# Patient Record
Sex: MALE | Race: OTHER | NOT HISPANIC OR LATINO | Employment: FULL TIME | URBAN - METROPOLITAN AREA
[De-identification: names, ages, dates, MRNs, and addresses within clinical notes are randomized per-mention and may not be internally consistent; named-entity substitution may affect disease eponyms.]

---

## 2022-12-04 ENCOUNTER — HOSPITAL ENCOUNTER (INPATIENT)
Facility: HOSPITAL | Age: 52
LOS: 2 days | Discharge: HOME | End: 2022-12-06
Attending: EMERGENCY MEDICINE | Admitting: INTERNAL MEDICINE

## 2022-12-04 ENCOUNTER — APPOINTMENT (OUTPATIENT)
Dept: CT IMAGING | Facility: HOSPITAL | Age: 52
End: 2022-12-04

## 2022-12-04 ENCOUNTER — APPOINTMENT (OUTPATIENT)
Dept: GENERAL RADIOLOGY | Facility: HOSPITAL | Age: 52
End: 2022-12-04

## 2022-12-04 DIAGNOSIS — A41.9 SEPSIS, DUE TO UNSPECIFIED ORGANISM, UNSPECIFIED WHETHER ACUTE ORGAN DYSFUNCTION PRESENT: Primary | ICD-10-CM

## 2022-12-04 DIAGNOSIS — J18.9 PNEUMONIA OF LEFT UPPER LOBE DUE TO INFECTIOUS ORGANISM: ICD-10-CM

## 2022-12-04 DIAGNOSIS — R13.19 ESOPHAGEAL DYSPHAGIA: ICD-10-CM

## 2022-12-04 LAB
ALBUMIN SERPL-MCNC: 4.2 G/DL (ref 3.5–5.2)
ALBUMIN/GLOB SERPL: 1 G/DL
ALP SERPL-CCNC: 130 U/L (ref 39–117)
ALT SERPL W P-5'-P-CCNC: 27 U/L (ref 1–41)
ANION GAP SERPL CALCULATED.3IONS-SCNC: 12.8 MMOL/L (ref 5–15)
ANION GAP SERPL CALCULATED.3IONS-SCNC: 16.1 MMOL/L (ref 5–15)
AST SERPL-CCNC: 21 U/L (ref 1–40)
BACTERIA UR QL AUTO: ABNORMAL /HPF
BASOPHILS # BLD AUTO: 0.09 10*3/MM3 (ref 0–0.2)
BASOPHILS # BLD MANUAL: 0.26 10*3/MM3 (ref 0–0.2)
BASOPHILS NFR BLD AUTO: 0.3 % (ref 0–1.5)
BASOPHILS NFR BLD MANUAL: 1 % (ref 0–1.5)
BILIRUB SERPL-MCNC: 0.8 MG/DL (ref 0–1.2)
BILIRUB UR QL STRIP: NEGATIVE
BUN SERPL-MCNC: 18 MG/DL (ref 6–20)
BUN SERPL-MCNC: 22 MG/DL (ref 6–20)
BUN/CREAT SERPL: 12.5 (ref 7–25)
BUN/CREAT SERPL: 15.1 (ref 7–25)
CALCIUM SPEC-SCNC: 8.8 MG/DL (ref 8.6–10.5)
CALCIUM SPEC-SCNC: 9.7 MG/DL (ref 8.6–10.5)
CHLORIDE SERPL-SCNC: 92 MMOL/L (ref 98–107)
CHLORIDE SERPL-SCNC: 94 MMOL/L (ref 98–107)
CLARITY UR: CLEAR
CO2 SERPL-SCNC: 26.9 MMOL/L (ref 22–29)
CO2 SERPL-SCNC: 27.2 MMOL/L (ref 22–29)
COLOR UR: YELLOW
CREAT SERPL-MCNC: 1.19 MG/DL (ref 0.76–1.27)
CREAT SERPL-MCNC: 1.76 MG/DL (ref 0.76–1.27)
D DIMER PPP FEU-MCNC: 3.21 MCGFEU/ML (ref 0–0.54)
D-LACTATE SERPL-SCNC: 1.8 MMOL/L (ref 0.5–2)
D-LACTATE SERPL-SCNC: 2.3 MMOL/L (ref 0.5–2)
D-LACTATE SERPL-SCNC: 2.4 MMOL/L (ref 0.5–2)
D-LACTATE SERPL-SCNC: 3.3 MMOL/L (ref 0.5–2)
DEPRECATED RDW RBC AUTO: 44.3 FL (ref 37–54)
DEPRECATED RDW RBC AUTO: 45.5 FL (ref 37–54)
EGFRCR SERPLBLD CKD-EPI 2021: 45.4 ML/MIN/1.73
EGFRCR SERPLBLD CKD-EPI 2021: 72.6 ML/MIN/1.73
EOSINOPHIL # BLD AUTO: 0.02 10*3/MM3 (ref 0–0.4)
EOSINOPHIL # BLD MANUAL: 0.26 10*3/MM3 (ref 0–0.4)
EOSINOPHIL NFR BLD AUTO: 0.1 % (ref 0.3–6.2)
EOSINOPHIL NFR BLD MANUAL: 1 % (ref 0.3–6.2)
ERYTHROCYTE [DISTWIDTH] IN BLOOD BY AUTOMATED COUNT: 14.7 % (ref 12.3–15.4)
ERYTHROCYTE [DISTWIDTH] IN BLOOD BY AUTOMATED COUNT: 14.9 % (ref 12.3–15.4)
FLUAV AG NPH QL: NEGATIVE
FLUBV AG NPH QL IA: NEGATIVE
GLOBULIN UR ELPH-MCNC: 4.1 GM/DL
GLUCOSE BLDC GLUCOMTR-MCNC: 225 MG/DL (ref 70–99)
GLUCOSE BLDC GLUCOMTR-MCNC: 226 MG/DL (ref 70–99)
GLUCOSE BLDC GLUCOMTR-MCNC: 255 MG/DL (ref 70–99)
GLUCOSE BLDC GLUCOMTR-MCNC: 258 MG/DL (ref 70–99)
GLUCOSE SERPL-MCNC: 276 MG/DL (ref 65–99)
GLUCOSE SERPL-MCNC: 387 MG/DL (ref 65–99)
GLUCOSE UR STRIP-MCNC: ABNORMAL MG/DL
HCT VFR BLD AUTO: 33.2 % (ref 37.5–51)
HCT VFR BLD AUTO: 36.2 % (ref 37.5–51)
HGB BLD-MCNC: 11.1 G/DL (ref 13–17.7)
HGB BLD-MCNC: 12.1 G/DL (ref 13–17.7)
HGB UR QL STRIP.AUTO: ABNORMAL
HOLD SPECIMEN: NORMAL
HOLD SPECIMEN: NORMAL
HYALINE CASTS UR QL AUTO: ABNORMAL /LPF
IMM GRANULOCYTES # BLD AUTO: 1.58 10*3/MM3 (ref 0–0.05)
IMM GRANULOCYTES NFR BLD AUTO: 6 % (ref 0–0.5)
KETONES UR QL STRIP: NEGATIVE
L PNEUMO1 AG UR QL IA: NEGATIVE
LEUKOCYTE ESTERASE UR QL STRIP.AUTO: NEGATIVE
LIPASE SERPL-CCNC: 31 U/L (ref 13–60)
LYMPHOCYTES # BLD AUTO: 3.22 10*3/MM3 (ref 0.7–3.1)
LYMPHOCYTES # BLD MANUAL: 5.4 10*3/MM3 (ref 0.7–3.1)
LYMPHOCYTES NFR BLD AUTO: 12.2 % (ref 19.6–45.3)
LYMPHOCYTES NFR BLD MANUAL: 4 % (ref 5–12)
MAGNESIUM SERPL-MCNC: 2.1 MG/DL (ref 1.6–2.6)
MAGNESIUM SERPL-MCNC: 2.2 MG/DL (ref 1.6–2.6)
MCH RBC QN AUTO: 27.6 PG (ref 26.6–33)
MCH RBC QN AUTO: 27.8 PG (ref 26.6–33)
MCHC RBC AUTO-ENTMCNC: 33.4 G/DL (ref 31.5–35.7)
MCHC RBC AUTO-ENTMCNC: 33.4 G/DL (ref 31.5–35.7)
MCV RBC AUTO: 82.5 FL (ref 79–97)
MCV RBC AUTO: 83 FL (ref 79–97)
METAMYELOCYTES NFR BLD MANUAL: 1 % (ref 0–0)
MONOCYTES # BLD AUTO: 1.9 10*3/MM3 (ref 0.1–0.9)
MONOCYTES # BLD: 1.03 10*3/MM3 (ref 0.1–0.9)
MONOCYTES NFR BLD AUTO: 7.2 % (ref 5–12)
MRSA DNA SPEC QL NAA+PROBE: NORMAL
NEUTROPHILS # BLD AUTO: 18.52 10*3/MM3 (ref 1.7–7)
NEUTROPHILS NFR BLD AUTO: 19.6 10*3/MM3 (ref 1.7–7)
NEUTROPHILS NFR BLD AUTO: 74.2 % (ref 42.7–76)
NEUTROPHILS NFR BLD MANUAL: 70 % (ref 42.7–76)
NEUTS BAND NFR BLD MANUAL: 2 % (ref 0–5)
NITRITE UR QL STRIP: NEGATIVE
NRBC BLD AUTO-RTO: 0 /100 WBC (ref 0–0.2)
NT-PROBNP SERPL-MCNC: 284.9 PG/ML (ref 0–900)
PH UR STRIP.AUTO: 5.5 [PH] (ref 5–8)
PHOSPHATE SERPL-MCNC: 4.1 MG/DL (ref 2.5–4.5)
PLAT MORPH BLD: NORMAL
PLATELET # BLD AUTO: 313 10*3/MM3 (ref 140–450)
PLATELET # BLD AUTO: 342 10*3/MM3 (ref 140–450)
PMV BLD AUTO: 11 FL (ref 6–12)
PMV BLD AUTO: 11.1 FL (ref 6–12)
POTASSIUM SERPL-SCNC: 3.3 MMOL/L (ref 3.5–5.2)
POTASSIUM SERPL-SCNC: 3.6 MMOL/L (ref 3.5–5.2)
PROCALCITONIN SERPL-MCNC: 9.02 NG/ML (ref 0–0.25)
PROT SERPL-MCNC: 8.3 G/DL (ref 6–8.5)
PROT UR QL STRIP: ABNORMAL
QT INTERVAL: 308 MS
QT INTERVAL: 320 MS
RBC # BLD AUTO: 4 10*6/MM3 (ref 4.14–5.8)
RBC # BLD AUTO: 4.39 10*6/MM3 (ref 4.14–5.8)
RBC # UR STRIP: ABNORMAL /HPF
RBC MORPH BLD: NORMAL
REF LAB TEST METHOD: ABNORMAL
S PNEUM AG SPEC QL LA: NEGATIVE
SARS-COV-2 RNA PNL SPEC NAA+PROBE: NOT DETECTED
SCAN SLIDE: NORMAL
SODIUM SERPL-SCNC: 134 MMOL/L (ref 136–145)
SODIUM SERPL-SCNC: 135 MMOL/L (ref 136–145)
SP GR UR STRIP: 1.02 (ref 1–1.03)
SQUAMOUS #/AREA URNS HPF: ABNORMAL /HPF
TROPONIN I SERPL-MCNC: 0.06 NG/ML (ref 0–0.08)
TROPONIN I SERPL-MCNC: 0.06 NG/ML (ref 0–0.08)
UROBILINOGEN UR QL STRIP: ABNORMAL
VARIANT LYMPHS NFR BLD MANUAL: 19 % (ref 19.6–45.3)
VARIANT LYMPHS NFR BLD MANUAL: 2 % (ref 0–5)
WBC # UR STRIP: ABNORMAL /HPF
WBC MORPH BLD: NORMAL
WBC NRBC COR # BLD: 25.72 10*3/MM3 (ref 3.4–10.8)
WBC NRBC COR # BLD: 26.41 10*3/MM3 (ref 3.4–10.8)
WHOLE BLOOD HOLD COAG: NORMAL
WHOLE BLOOD HOLD SPECIMEN: NORMAL

## 2022-12-04 PROCEDURE — 87040 BLOOD CULTURE FOR BACTERIA: CPT | Performed by: EMERGENCY MEDICINE

## 2022-12-04 PROCEDURE — U0004 COV-19 TEST NON-CDC HGH THRU: HCPCS | Performed by: EMERGENCY MEDICINE

## 2022-12-04 PROCEDURE — 87641 MR-STAPH DNA AMP PROBE: CPT | Performed by: STUDENT IN AN ORGANIZED HEALTH CARE EDUCATION/TRAINING PROGRAM

## 2022-12-04 PROCEDURE — 83690 ASSAY OF LIPASE: CPT | Performed by: EMERGENCY MEDICINE

## 2022-12-04 PROCEDURE — 25010000002 KETOROLAC TROMETHAMINE PER 15 MG: Performed by: EMERGENCY MEDICINE

## 2022-12-04 PROCEDURE — 93005 ELECTROCARDIOGRAM TRACING: CPT | Performed by: EMERGENCY MEDICINE

## 2022-12-04 PROCEDURE — 84100 ASSAY OF PHOSPHORUS: CPT | Performed by: STUDENT IN AN ORGANIZED HEALTH CARE EDUCATION/TRAINING PROGRAM

## 2022-12-04 PROCEDURE — 81003 URINALYSIS AUTO W/O SCOPE: CPT | Performed by: EMERGENCY MEDICINE

## 2022-12-04 PROCEDURE — 83880 ASSAY OF NATRIURETIC PEPTIDE: CPT | Performed by: EMERGENCY MEDICINE

## 2022-12-04 PROCEDURE — 85379 FIBRIN DEGRADATION QUANT: CPT | Performed by: EMERGENCY MEDICINE

## 2022-12-04 PROCEDURE — 25010000002 ENOXAPARIN PER 10 MG: Performed by: STUDENT IN AN ORGANIZED HEALTH CARE EDUCATION/TRAINING PROGRAM

## 2022-12-04 PROCEDURE — 84484 ASSAY OF TROPONIN QUANT: CPT

## 2022-12-04 PROCEDURE — 63710000001 INSULIN LISPRO (HUMAN) PER 5 UNITS: Performed by: STUDENT IN AN ORGANIZED HEALTH CARE EDUCATION/TRAINING PROGRAM

## 2022-12-04 PROCEDURE — 83605 ASSAY OF LACTIC ACID: CPT | Performed by: EMERGENCY MEDICINE

## 2022-12-04 PROCEDURE — 94799 UNLISTED PULMONARY SVC/PX: CPT

## 2022-12-04 PROCEDURE — 93010 ELECTROCARDIOGRAM REPORT: CPT | Performed by: INTERNAL MEDICINE

## 2022-12-04 PROCEDURE — 63710000001 INSULIN REGULAR HUMAN PER 5 UNITS: Performed by: STUDENT IN AN ORGANIZED HEALTH CARE EDUCATION/TRAINING PROGRAM

## 2022-12-04 PROCEDURE — 87899 AGENT NOS ASSAY W/OPTIC: CPT | Performed by: STUDENT IN AN ORGANIZED HEALTH CARE EDUCATION/TRAINING PROGRAM

## 2022-12-04 PROCEDURE — 85025 COMPLETE CBC W/AUTO DIFF WBC: CPT | Performed by: INTERNAL MEDICINE

## 2022-12-04 PROCEDURE — 0 CEFTRIAXONE PER 250 MG: Performed by: EMERGENCY MEDICINE

## 2022-12-04 PROCEDURE — 99285 EMERGENCY DEPT VISIT HI MDM: CPT

## 2022-12-04 PROCEDURE — 94640 AIRWAY INHALATION TREATMENT: CPT

## 2022-12-04 PROCEDURE — 87804 INFLUENZA ASSAY W/OPTIC: CPT | Performed by: EMERGENCY MEDICINE

## 2022-12-04 PROCEDURE — 36415 COLL VENOUS BLD VENIPUNCTURE: CPT | Performed by: INTERNAL MEDICINE

## 2022-12-04 PROCEDURE — 0 IOPAMIDOL PER 1 ML: Performed by: EMERGENCY MEDICINE

## 2022-12-04 PROCEDURE — 82962 GLUCOSE BLOOD TEST: CPT

## 2022-12-04 PROCEDURE — 87449 NOS EACH ORGANISM AG IA: CPT | Performed by: STUDENT IN AN ORGANIZED HEALTH CARE EDUCATION/TRAINING PROGRAM

## 2022-12-04 PROCEDURE — 71260 CT THORAX DX C+: CPT

## 2022-12-04 PROCEDURE — 80053 COMPREHEN METABOLIC PANEL: CPT | Performed by: INTERNAL MEDICINE

## 2022-12-04 PROCEDURE — 83735 ASSAY OF MAGNESIUM: CPT | Performed by: EMERGENCY MEDICINE

## 2022-12-04 PROCEDURE — 99223 1ST HOSP IP/OBS HIGH 75: CPT | Performed by: STUDENT IN AN ORGANIZED HEALTH CARE EDUCATION/TRAINING PROGRAM

## 2022-12-04 PROCEDURE — 84145 PROCALCITONIN (PCT): CPT | Performed by: INTERNAL MEDICINE

## 2022-12-04 PROCEDURE — 85025 COMPLETE CBC W/AUTO DIFF WBC: CPT | Performed by: EMERGENCY MEDICINE

## 2022-12-04 PROCEDURE — 71045 X-RAY EXAM CHEST 1 VIEW: CPT

## 2022-12-04 PROCEDURE — 94660 CPAP INITIATION&MGMT: CPT

## 2022-12-04 PROCEDURE — 25010000002 AZITHROMYCIN PER 500 MG: Performed by: EMERGENCY MEDICINE

## 2022-12-04 PROCEDURE — 85007 BL SMEAR W/DIFF WBC COUNT: CPT | Performed by: EMERGENCY MEDICINE

## 2022-12-04 PROCEDURE — 81001 URINALYSIS AUTO W/SCOPE: CPT | Performed by: EMERGENCY MEDICINE

## 2022-12-04 PROCEDURE — 83735 ASSAY OF MAGNESIUM: CPT | Performed by: INTERNAL MEDICINE

## 2022-12-04 RX ORDER — DEXTROSE MONOHYDRATE 25 G/50ML
25 INJECTION, SOLUTION INTRAVENOUS
Status: DISCONTINUED | OUTPATIENT
Start: 2022-12-04 | End: 2022-12-06 | Stop reason: HOSPADM

## 2022-12-04 RX ORDER — CEFTRIAXONE SODIUM 2 G/50ML
2 INJECTION, SOLUTION INTRAVENOUS ONCE
Status: COMPLETED | OUTPATIENT
Start: 2022-12-04 | End: 2022-12-04

## 2022-12-04 RX ORDER — NICOTINE POLACRILEX 4 MG
15 LOZENGE BUCCAL
Status: DISCONTINUED | OUTPATIENT
Start: 2022-12-04 | End: 2022-12-06 | Stop reason: HOSPADM

## 2022-12-04 RX ORDER — CEFTRIAXONE SODIUM 2 G/50ML
2 INJECTION, SOLUTION INTRAVENOUS EVERY 24 HOURS
Status: DISCONTINUED | OUTPATIENT
Start: 2022-12-05 | End: 2022-12-06 | Stop reason: HOSPADM

## 2022-12-04 RX ORDER — ATENOLOL 25 MG/1
1 TABLET ORAL DAILY
COMMUNITY
Start: 2022-01-17 | End: 2023-02-08

## 2022-12-04 RX ORDER — ACETAMINOPHEN 325 MG/1
650 TABLET ORAL EVERY 4 HOURS PRN
Status: DISCONTINUED | OUTPATIENT
Start: 2022-12-04 | End: 2022-12-06 | Stop reason: HOSPADM

## 2022-12-04 RX ORDER — SODIUM CHLORIDE 9 MG/ML
40 INJECTION, SOLUTION INTRAVENOUS AS NEEDED
Status: DISCONTINUED | OUTPATIENT
Start: 2022-12-04 | End: 2022-12-06 | Stop reason: HOSPADM

## 2022-12-04 RX ORDER — NITROGLYCERIN 0.4 MG/1
0.4 TABLET SUBLINGUAL
Status: DISCONTINUED | OUTPATIENT
Start: 2022-12-04 | End: 2022-12-06 | Stop reason: HOSPADM

## 2022-12-04 RX ORDER — INSULIN LISPRO 100 [IU]/ML
2-7 INJECTION, SOLUTION INTRAVENOUS; SUBCUTANEOUS
Status: DISCONTINUED | OUTPATIENT
Start: 2022-12-04 | End: 2022-12-06 | Stop reason: HOSPADM

## 2022-12-04 RX ORDER — LISINOPRIL AND HYDROCHLOROTHIAZIDE 25; 20 MG/1; MG/1
1 TABLET ORAL DAILY
COMMUNITY
Start: 2022-01-17 | End: 2023-02-08

## 2022-12-04 RX ORDER — SODIUM CHLORIDE 0.9 % (FLUSH) 0.9 %
10 SYRINGE (ML) INJECTION AS NEEDED
Status: DISCONTINUED | OUTPATIENT
Start: 2022-12-04 | End: 2022-12-06 | Stop reason: HOSPADM

## 2022-12-04 RX ORDER — PANTOPRAZOLE SODIUM 40 MG/1
40 TABLET, DELAYED RELEASE ORAL
Status: DISCONTINUED | OUTPATIENT
Start: 2022-12-04 | End: 2022-12-06 | Stop reason: HOSPADM

## 2022-12-04 RX ORDER — SODIUM CHLORIDE 0.9 % (FLUSH) 0.9 %
10 SYRINGE (ML) INJECTION EVERY 12 HOURS SCHEDULED
Status: DISCONTINUED | OUTPATIENT
Start: 2022-12-04 | End: 2022-12-06 | Stop reason: HOSPADM

## 2022-12-04 RX ORDER — ACETAMINOPHEN 325 MG/1
650 TABLET ORAL EVERY 6 HOURS PRN
Status: DISCONTINUED | OUTPATIENT
Start: 2022-12-04 | End: 2022-12-04 | Stop reason: ALTCHOICE

## 2022-12-04 RX ORDER — ENOXAPARIN SODIUM 100 MG/ML
40 INJECTION SUBCUTANEOUS DAILY
Status: DISCONTINUED | OUTPATIENT
Start: 2022-12-04 | End: 2022-12-06 | Stop reason: HOSPADM

## 2022-12-04 RX ORDER — ASPIRIN 81 MG/1
324 TABLET, CHEWABLE ORAL ONCE
Status: DISCONTINUED | OUTPATIENT
Start: 2022-12-04 | End: 2022-12-04

## 2022-12-04 RX ORDER — KETOROLAC TROMETHAMINE 30 MG/ML
30 INJECTION, SOLUTION INTRAMUSCULAR; INTRAVENOUS ONCE
Status: COMPLETED | OUTPATIENT
Start: 2022-12-04 | End: 2022-12-04

## 2022-12-04 RX ORDER — ATORVASTATIN CALCIUM 40 MG/1
40 TABLET, FILM COATED ORAL DAILY
Status: DISCONTINUED | OUTPATIENT
Start: 2022-12-04 | End: 2022-12-06 | Stop reason: HOSPADM

## 2022-12-04 RX ORDER — POTASSIUM CHLORIDE 750 MG/1
40 CAPSULE, EXTENDED RELEASE ORAL EVERY 4 HOURS
Status: COMPLETED | OUTPATIENT
Start: 2022-12-04 | End: 2022-12-04

## 2022-12-04 RX ORDER — SODIUM CHLORIDE, SODIUM LACTATE, POTASSIUM CHLORIDE, CALCIUM CHLORIDE 600; 310; 30; 20 MG/100ML; MG/100ML; MG/100ML; MG/100ML
150 INJECTION, SOLUTION INTRAVENOUS CONTINUOUS
Status: DISCONTINUED | OUTPATIENT
Start: 2022-12-04 | End: 2022-12-04

## 2022-12-04 RX ORDER — GLIMEPIRIDE 2 MG/1
1 TABLET ORAL
COMMUNITY
Start: 2022-01-18 | End: 2023-01-18

## 2022-12-04 RX ORDER — PANTOPRAZOLE SODIUM 40 MG/1
20-40 TABLET, DELAYED RELEASE ORAL DAILY
COMMUNITY
Start: 2022-01-17 | End: 2023-02-08

## 2022-12-04 RX ORDER — ATORVASTATIN CALCIUM 40 MG/1
40 TABLET, FILM COATED ORAL
COMMUNITY
Start: 2022-01-17 | End: 2023-01-17

## 2022-12-04 RX ORDER — GUAIFENESIN AND DEXTROMETHORPHAN HYDROBROMIDE 600; 30 MG/1; MG/1
1 TABLET, EXTENDED RELEASE ORAL 2 TIMES DAILY PRN
Status: DISCONTINUED | OUTPATIENT
Start: 2022-12-04 | End: 2022-12-06 | Stop reason: HOSPADM

## 2022-12-04 RX ORDER — IPRATROPIUM BROMIDE AND ALBUTEROL SULFATE 2.5; .5 MG/3ML; MG/3ML
3 SOLUTION RESPIRATORY (INHALATION) EVERY 6 HOURS PRN
Status: DISCONTINUED | OUTPATIENT
Start: 2022-12-04 | End: 2022-12-06 | Stop reason: HOSPADM

## 2022-12-04 RX ORDER — ATENOLOL 25 MG/1
25 TABLET ORAL DAILY
Status: DISCONTINUED | OUTPATIENT
Start: 2022-12-04 | End: 2022-12-06 | Stop reason: HOSPADM

## 2022-12-04 RX ADMIN — SODIUM CHLORIDE 1000 ML: 9 INJECTION, SOLUTION INTRAVENOUS at 03:31

## 2022-12-04 RX ADMIN — INSULIN LISPRO 3 UNITS: 100 INJECTION, SOLUTION INTRAVENOUS; SUBCUTANEOUS at 17:47

## 2022-12-04 RX ADMIN — IOPAMIDOL 100 ML: 755 INJECTION, SOLUTION INTRAVENOUS at 04:55

## 2022-12-04 RX ADMIN — PANTOPRAZOLE SODIUM 40 MG: 40 TABLET, DELAYED RELEASE ORAL at 09:19

## 2022-12-04 RX ADMIN — POTASSIUM CHLORIDE 40 MEQ: 10 CAPSULE, COATED, EXTENDED RELEASE ORAL at 22:20

## 2022-12-04 RX ADMIN — INSULIN HUMAN 5 UNITS: 100 INJECTION, SOLUTION PARENTERAL at 09:12

## 2022-12-04 RX ADMIN — ACETAMINOPHEN 650 MG: 325 TABLET ORAL at 23:07

## 2022-12-04 RX ADMIN — POTASSIUM CHLORIDE 40 MEQ: 10 CAPSULE, COATED, EXTENDED RELEASE ORAL at 17:48

## 2022-12-04 RX ADMIN — Medication 10 ML: at 09:28

## 2022-12-04 RX ADMIN — KETOROLAC TROMETHAMINE 30 MG: 30 INJECTION, SOLUTION INTRAMUSCULAR; INTRAVENOUS at 03:30

## 2022-12-04 RX ADMIN — ACETAMINOPHEN 650 MG: 325 TABLET ORAL at 15:58

## 2022-12-04 RX ADMIN — ATENOLOL 25 MG: 25 TABLET ORAL at 09:19

## 2022-12-04 RX ADMIN — ENOXAPARIN SODIUM 40 MG: 100 INJECTION SUBCUTANEOUS at 09:28

## 2022-12-04 RX ADMIN — SODIUM CHLORIDE, POTASSIUM CHLORIDE, SODIUM LACTATE AND CALCIUM CHLORIDE 150 ML/HR: 600; 310; 30; 20 INJECTION, SOLUTION INTRAVENOUS at 09:27

## 2022-12-04 RX ADMIN — Medication 10 ML: at 22:20

## 2022-12-04 RX ADMIN — AZITHROMYCIN MONOHYDRATE 500 MG: 500 INJECTION, POWDER, LYOPHILIZED, FOR SOLUTION INTRAVENOUS at 04:47

## 2022-12-04 RX ADMIN — CEFTRIAXONE SODIUM 2 G: 2 INJECTION, SOLUTION INTRAVENOUS at 03:41

## 2022-12-04 RX ADMIN — ATORVASTATIN CALCIUM 40 MG: 40 TABLET, FILM COATED ORAL at 09:19

## 2022-12-04 RX ADMIN — HYDROCODONE POLISTIREX AND CHLORPHENIRAMINE POLISTIREX 2.5 ML: 10; 8 SUSPENSION, EXTENDED RELEASE ORAL at 09:27

## 2022-12-04 RX ADMIN — INSULIN LISPRO 4 UNITS: 100 INJECTION, SOLUTION INTRAVENOUS; SUBCUTANEOUS at 13:21

## 2022-12-04 RX ADMIN — IPRATROPIUM BROMIDE AND ALBUTEROL SULFATE 3 ML: .5; 3 SOLUTION RESPIRATORY (INHALATION) at 08:43

## 2022-12-04 NOTE — PROGRESS NOTES
[facility]   Hospitalist Progress Note  Date: 2022  Patient Name: Tung Smith  : 1970  MRN: [id number]  Date of admission: 2022      Subjective   Subjective     Chief Complaint:   Shortness of breath, chest pain, cough    Summary:   Tung Smith is a 52 y.o. male with a past medical history of diabetes, hypertension presented to the ED with complaints of cough, fevers, shortness of breath, and pleuritic chest pain.  Patient started having fevers on last , the following day he started having cough that has progressed from productive yellowish sputum to occasional blood streaks.    For the previous 4 days patient has been experiencing shortness of breath with exertion and left-sided chest pain with coughing or deep breath and came to the ED on the morning of 2022. Denies nausea, vomiting, diarrhea, myalgias, weakness, numbness, tingling, headache, visual changes, unexpected weight loss.     In the ED, vital signs temperature 98.6, pulse 111, respiratory 26, blood pressure 140/69 on room air 94%.  Labs were significant for glucose 387, sodium 135, chloride 92, anion gap of 16.1, creatinine 1.76 baseline around 1.1, lactate 3.3, D-dimer 3.21, WBC 25.72, influenza negative, UA negative for nitrates, leukocytes.  Troponin x2 negative, normal proBNP.  EKG showed sinus tachycardia, no significant ST, T wave changes concerning for CAD.  Chest x-ray showed left-sided pneumonia, given the elevated D-dimer and chest pain and tachycardia CTA chest was done which showed no PE, left upper lobe pneumonia, mild acute infiltrate in the right upper lobe as well.  Received ceftriaxone and azithromycin in the ED.  The patient has been admitted inpatient for the management of the community-acquired pneumonia.    Interval Followup:   Given atypical location of patient's pneumonia discussed further.  Patient has a history of esophageal dilations with gastroenterology, stating he has had at least 2  or 3.'s been 1 year since his past dilation.  We will start patient on a mechanical soft diet and reach out to gastroenterology tomorrow to see if he is a candidate for EGD.  Patient's flu swab is returned negative's, still awaiting COVID swab.  Patient was started on IV fluids, repeating labs now to monitor renal function as well as glucose.  Troponins have all been negative, EKG is negative, chest pain is described as pleuritic    Review of Systems   All systems were reviewed and negative except for: Continued pleuritic chest pain    Objective   Objective     Vitals:   Temp:  [99.5 °F (37.5 °C)-99.6 °F (37.6 °C)] 99.5 °F (37.5 °C)  Heart Rate:  [] 87  Resp:  [16-28] 16  BP: (131-167)/(60-88) 137/82   Physical Exam    Constitutional: Awake, alert, no acute distress   Eyes: Pupils equal, sclerae anicteric, no conjunctival injection   HENT: NCAT, mucous membranes moist   Neck: Supple, no thyromegaly, no lymphadenopathy, trachea midline   Respiratory: Minimal diminished breath sounds to left upper lobe, no wheezing no rhonchi   Cardiovascular: RRR, no murmurs, rubs, or gallops, palpable pedal pulses bilaterally   Gastrointestinal: Positive bowel sounds, soft, nontender, nondistended   Musculoskeletal: No bilateral ankle edema, no clubbing or cyanosis to extremities   Psychiatric: Appropriate affect, cooperative   Neurologic: Oriented x 3, strength symmetric in all extremities, Cranial Nerves grossly intact to confrontation, speech clear   Skin: No rashes     Result Review    Result Review:  I have personally reviewed the results from 12/4/2022 and agree with these findings:  [x]  Laboratory  []  Microbiology  [x]  Radiology  []  EKG/Telemetry   []  Cardiology/Vascular   []  Pathology  [x]  Old records  []  Other:    Assessment & Plan   Assessment / Plan     Assessment/Plan:  Sepsis secondary to pneumonia  Lactic acidosis, clinically significant  Left upper lobe pneumonia resulting in the above  TEO likely  secondary to sepsis and dehydration  Diabetes  Hypertension  History of esophageal strictures requiring dilation    Plan:  Patient remains admitted to hospital for further care  Started on ceftriaxone azithromycin  Repeating all labs now, if improvement in creatinine we will stop IV fluids  Continue to trend lactates normal  Continue to monitor glucose, increase long-acting and sliding scale if needed  Troponins have trended normal, will feel chest pain is pleuritic in nature  We will reach out to gastroenterology for their opinion on EGD with eval for dilation with history of strictures  Discussed plan with RN.    DVT prophylaxis:  Medical DVT prophylaxis orders are present.    CODE STATUS:   Level Of Support Discussed With: Patient  Code Status (Patient has no pulse and is not breathing): CPR (Attempt to Resuscitate)  Medical Interventions (Patient has pulse or is breathing): Full Support

## 2022-12-04 NOTE — ED NOTES
Patient arrived via [facility] for cough and cold symptoms with fever that began Tuesday, and leticia colored productive sputum. Also complains of chest pain that started Tuesday worsens when he coughs.

## 2022-12-04 NOTE — ED NOTES
Patient alert and oriented x4.  Mentioned having chest pain after initial symtoms of cold/flu.  Patient states the chest pain does worsen with coughing and deep breaths.

## 2022-12-04 NOTE — ED PROVIDER NOTES
Time: 2:54 AM EST    Chief Complaint: Chest Pain    History of Present Illness:      Patient is a 52 y.o. year old male that presents to the emergency department with left chest pain. Pt states that pain started around Sunday. Pt reports that symptoms have worsened since Tuesday. Pt reports productive cough with phlegm, SO, fever, and chills. Pt reports taking ibuprofen to ease symptoms. Pt denies being around any sick. Pt reports being from [place] and becoming sick shortly after traveling. Pt denies leg pain and swelling.       History provided by:  Patient   used: No        Similar Symptoms Previously: N/A  Recently seen: N/A      Patient Care Team  Primary Care Provider: Provider, No Known    Past Medical History:     No Known Allergies  Past Medical History:   Diagnosis Date   • Diabetes mellitus (HCC)    • Hyperlipidemia    • Hypertension    • Sleep apnea      History reviewed. No pertinent surgical history.  History reviewed. No pertinent family history.    Home Medications:  Prior to Admission medications    Not on File        Social History:   Social History     Tobacco Use   • Smoking status: Never   Substance Use Topics   • Alcohol use: Never   • Drug use: Never       Record Review:  I have reviewed the patient's records in [facility].     Review of Systems:  Review of Systems   Constitutional: Positive for chills and fever.   HENT: Negative for congestion, rhinorrhea and sore throat.    Eyes: Negative for pain and visual disturbance.   Respiratory: Positive for cough and shortness of breath. Negative for apnea and chest tightness.    Cardiovascular: Positive for chest pain. Negative for palpitations.   Gastrointestinal: Negative for abdominal pain, diarrhea, nausea and vomiting.   Genitourinary: Negative for difficulty urinating and dysuria.   Musculoskeletal: Negative for joint swelling and myalgias.   Skin: Negative for color change.   Neurological: Negative for seizures and headaches.  "  Psychiatric/Behavioral: Negative.    All other systems reviewed and are negative.       Physical Exam:  /60   Pulse 115   Temp 99.6 °F (37.6 °C) (Oral)   Resp 20   Ht 160 cm (63\")   Wt 93.1 kg (205 lb 4 oz)   SpO2 90%   BMI 36.36 kg/m²     Physical Exam  Vitals and nursing note reviewed.   Constitutional:       General: He is not in acute distress.     Appearance: Normal appearance. He is ill-appearing. He is not toxic-appearing.   HENT:      Head: Normocephalic and atraumatic.      Jaw: There is normal jaw occlusion.   Eyes:      General: Lids are normal.      Extraocular Movements: Extraocular movements intact.      Conjunctiva/sclera: Conjunctivae normal.      Pupils: Pupils are equal, round, and reactive to light.   Cardiovascular:      Rate and Rhythm: Regular rhythm. Tachycardia present.      Pulses: Normal pulses.      Heart sounds: Normal heart sounds.   Pulmonary:      Effort: Pulmonary effort is normal. No respiratory distress.      Breath sounds: Normal breath sounds. No wheezing or rhonchi.      Comments: Coughing Frequently  Abdominal:      General: Abdomen is flat.      Palpations: Abdomen is soft.      Tenderness: There is no abdominal tenderness. There is no guarding or rebound.   Musculoskeletal:         General: Normal range of motion.      Cervical back: Normal range of motion and neck supple.      Right lower leg: No edema.      Left lower leg: No edema.   Skin:     General: Skin is warm and dry.   Neurological:      Mental Status: He is alert and oriented to person, place, and time. Mental status is at baseline.   Psychiatric:         Mood and Affect: Mood normal.                    Medications in the Emergency Department:  Medications   sodium chloride 0.9 % flush 10 mL (has no administration in time range)   aspirin chewable tablet 324 mg (324 mg Oral Not Given 12/4/22 8704)   sodium chloride 0.9 % flush 10 mL (has no administration in time range)   lactated ringers infusion " (has no administration in time range)   insulin regular (humuLIN R,novoLIN R) injection 5 Units (has no administration in time range)   ipratropium-albuterol (DUO-NEB) nebulizer solution 3 mL (has no administration in time range)   ketorolac (TORADOL) injection 30 mg (30 mg Intravenous Given 12/4/22 0330)   sodium chloride 0.9 % bolus 1,000 mL (0 mL Intravenous Stopped 12/4/22 0401)   cefTRIAXone (ROCEPHIN) IVPB 2 g (0 g Intravenous Stopped 12/4/22 0411)   AZITHROMYCIN 500 MG/250 ML 0.9% NS IVPB (vial-mate) (0 mg Intravenous Stopped 12/4/22 0547)   iopamidol (ISOVUE-370) 76 % injection 100 mL (100 mL Intravenous Given 12/4/22 0455)        Labs  Lab Results (last 24 hours)     Procedure Component Value Units Date/Time    CBC & Differential [[id number]]  (Abnormal) Collected: 12/04/22 0307    Specimen: Blood Updated: 12/04/22 0343    Narrative:      The following orders were created for panel order CBC & Differential.  Procedure                               Abnormality         Status                     ---------                               -----------         ------                     CBC Auto Differential[[id number]]        Abnormal            Final result               Scan Slide[[id number]]                                       Final result                 Please view results for these tests on the individual orders.    Comprehensive Metabolic Panel [[id number]]  (Abnormal) Collected: 12/04/22 0307    Specimen: Blood Updated: 12/04/22 0340     Glucose 387 mg/dL      BUN 22 mg/dL      Creatinine 1.76 mg/dL      Sodium 135 mmol/L      Potassium 3.6 mmol/L      Chloride 92 mmol/L      CO2 26.9 mmol/L      Calcium 9.7 mg/dL      Total Protein 8.3 g/dL      Albumin 4.20 g/dL      ALT (SGPT) 27 U/L      AST (SGOT) 21 U/L      Alkaline Phosphatase 130 U/L      Total Bilirubin 0.8 mg/dL      Globulin 4.1 gm/dL      A/G Ratio 1.0 g/dL      BUN/Creatinine Ratio 12.5     Anion Gap 16.1 mmol/L      eGFR 45.4 mL/min/1.73       Comment: National Kidney Foundation and American Society of Nephrology (ASN) Task Force recommended calculation based on the Chronic Kidney Disease Epidemiology Collaboration (CKD-EPI) equation refit without adjustment for race.       Narrative:      GFR Normal >60  Chronic Kidney Disease <60  Kidney Failure <15      Lipase [[id number]]  (Normal) Collected: 12/04/22 0307    Specimen: Blood Updated: 12/04/22 0340     Lipase 31 U/L     BNP [[id number]]  (Normal) Collected: 12/04/22 0307    Specimen: Blood Updated: 12/04/22 0338     proBNP 284.9 pg/mL     Narrative:      Among patients with dyspnea, NT-proBNP is highly sensitive for the detection of acute congestive heart failure. In addition NT-proBNP of <300 pg/ml effectively rules out acute congestive heart failure with 99% negative predictive value.      Magnesium [[id number]]  (Normal) Collected: 12/04/22 0307    Specimen: Blood Updated: 12/04/22 0340     Magnesium 2.2 mg/dL     CBC Auto Differential [[id number]]  (Abnormal) Collected: 12/04/22 0307    Specimen: Blood Updated: 12/04/22 0343     WBC 25.72 10*3/mm3      RBC 4.39 10*6/mm3      Hemoglobin 12.1 g/dL      Hematocrit 36.2 %      MCV 82.5 fL      MCH 27.6 pg      MCHC 33.4 g/dL      RDW 14.7 %      RDW-SD 44.3 fl      MPV 11.1 fL      Platelets 342 10*3/mm3     Lactic Acid, Plasma [[id number]]  (Abnormal) Collected: 12/04/22 0307    Specimen: Blood Updated: 12/04/22 0352     Lactate 3.3 mmol/L     Blood Culture - Blood, Arm, Right [[id number]] Collected: 12/04/22 0307    Specimen: Blood from Arm, Right Updated: 12/04/22 0319    Blood Culture - Blood, Arm, Right [[id number]] Collected: 12/04/22 0307    Specimen: Blood from Arm, Right Updated: 12/04/22 0319    Scan Slide [[id number]] Collected: 12/04/22 0307    Specimen: Blood Updated: 12/04/22 0343     Scan Slide --     Comment: See Manual Differential Results       D-dimer, Quantitative [[id number]]  (Abnormal) Collected: 12/04/22 0307    Specimen: Blood Updated:  "12/04/22 0349     D-Dimer, Quantitative 3.21 MCGFEU/mL     Narrative:      According to the assay 's published package insert, a normal (<0.50 MCGFEU/mL) D-dimer result in conjunction with a non-high clinical probability assessment, excludes deep vein thrombosis (DVT) and pulmonary embolism (PE) with high sensitivity.    D-dimer values increase with age and this can make VTE exclusion of an older population difficult. To address this, the American College of Physicians, based on best available evidence and recent guidelines, recommends that clinicians use age-adjusted D-dimer thresholds in patients greater than 50 years of age with: a) a low probability of PE who do not meet all Pulmonary Embolism Rule Out Criteria, or b) in those with intermediate probability of PE.   The formula for an age-adjusted D-dimer cut-off is \"age/100\".  For example, a 60 year old patient would have an age-adjusted cut-off of 0.60 MCGFEU/mL and an 80 year old 0.80 MCGFEU/mL.    Manual Differential [[id number]]  (Abnormal) Collected: 12/04/22 0307    Specimen: Blood Updated: 12/04/22 0346     Neutrophil % 70.0 %      Lymphocyte % 19.0 %      Monocyte % 4.0 %      Eosinophil % 1.0 %      Basophil % 1.0 %      Bands %  2.0 %      Metamyelocyte % 1.0 %      Atypical Lymphocyte % 2.0 %      Neutrophils Absolute 18.52 10*3/mm3      Lymphocytes Absolute 5.40 10*3/mm3      Monocytes Absolute 1.03 10*3/mm3      Eosinophils Absolute 0.26 10*3/mm3      Basophils Absolute 0.26 10*3/mm3      RBC Morphology Normal     WBC Morphology Normal     Platelet Morphology Normal    POC Troponin I [[id number]]  (Normal) Collected: 12/04/22 0312    Specimen: Blood Updated: 12/04/22 0324     Troponin I 0.06 ng/mL      Comment: Serial Number: [id number]:  [id number]       Influenza Antigen, Rapid - Swab, Nasopharynx [[id number]]  (Normal) Collected: 12/04/22 0314    Specimen: Swab from Nasopharynx Updated: 12/04/22 0350     Influenza A Ag, EIA Negative     " Influenza B Ag, EIA Negative    COVID-19,APTIMA PANTHER(PAUL),[facility], NP/OP SWAB IN UTM/VTM/SALINE TRANSPORT MEDIA,24 HR TAT - Swab, Nasal Cavity [[id number]] Collected: 12/04/22 0314    Specimen: Swab from Nasal Cavity Updated: 12/04/22 0318    Urinalysis With Microscopic If Indicated (No Culture) - Urine, Clean Catch [[id number]]  (Abnormal) Collected: 12/04/22 0335    Specimen: Urine, Clean Catch Updated: 12/04/22 0347     Color, UA Yellow     Appearance, UA Clear     pH, UA 5.5     Specific Gravity, UA 1.024     Glucose, UA >=1000 mg/dL (3+)     Ketones, UA Negative     Bilirubin, UA Negative     Blood, UA Trace     Protein,  mg/dL (2+)     Leuk Esterase, UA Negative     Nitrite, UA Negative     Urobilinogen, UA 1.0 E.U./dL    Urinalysis, Microscopic Only - Urine, Clean Catch [[id number]]  (Abnormal) Collected: 12/04/22 0335    Specimen: Urine, Clean Catch Updated: 12/04/22 0347     RBC, UA 0-2 /HPF      WBC, UA 0-2 /HPF      Bacteria, UA None Seen /HPF      Squamous Epithelial Cells, UA 0-2 /HPF      Hyaline Casts, UA 3-6 /LPF      Methodology Automated Microscopy    POC Troponin I [[id number]]  (Normal) Collected: 12/04/22 0449    Specimen: Blood Updated: 12/04/22 0502     Troponin I 0.06 ng/mL      Comment: Serial Number: [id number]:  [id number]       STAT Lactic Acid, Reflex [[id number]]  (Abnormal) Collected: 12/04/22 0632    Specimen: Blood Updated: 12/04/22 0712     Lactate 2.4 mmol/L            Imaging:  CT Chest With Contrast Diagnostic    Result Date: 12/4/2022  PROCEDURE: CT CHEST W CONTRAST DIAGNOSTIC  COMPARISON: None.  INDICATIONS: CHEST PAIN & SHORTNESS OF BREATH FOR 1 WEEK.  TECHNIQUE: After obtaining the patient's consent, 821 CT/CTA images were obtained with non-ionic intravenous contrast material.   PROTOCOL:   Standard pulmonary arteriogram CTA imaging protocol performed.    RADIATION:   Total DLP: 464.5 mGy*cm.   Automated exposure control was utilized to minimize radiation dose.  CONTRAST: 100 mL Isovue 370 I.V.  FINDINGS: No pulmonary embolism.  Dense pulmonary consolidation involves left upper lobe.  Left upper lobe pneumonia is suggested.  To a much lesser extent, a somewhat nodular ground-glass opacity involves the right upper lobe, measuring about 2.6 x 2.2 x 2.3 cm in AP (anteroposterior), transverse, and craniocaudal extent, respectively, as seen on image 51 of series 404, image 98 of series 402, and image 53 of series 403.  This finding is noncalcified and non-cavitating.  It may represent pneumonia.  Consider close interval clinical and imaging follow-up of the findings to ensure a benign progression to exclude an underlying malignant process.  Minimal, if any, pleural effusion is seen.  There may be a trace amount of pericardial effusion.  Mild cardiomegaly is possible.  There are small-to-moderate-sized mediastinal lymph nodes.  They may be reactive in nature.  Minimal residual thymic tissue is possible.  There is diffuse hepatic steatosis.  Hepatomegaly is possible.  The liver is partially imaged on the study.  No splenomegaly is suspected.  No acute findings are seen within the partially imaged upper abdomen.  There may be linear atelectasis and/or fibrosis in the left lower lobe.  To a lesser extent, similar findings are seen contralaterally.  Degenerative changes involve the imaged spine.  No acute fracture or aggressive osseous lesion is appreciated.  There may be minimal coronary artery calcifications.  The central tracheobronchial tree is well aerated without filling defect.  There may be mild peribronchial thickening, especially on the left.  No thoracic aortic aneurysm or dissection is appreciated.       No pulmonary embolism.  Left upper lobe pneumonia is present.  There may be mild acute infiltrate(s) in the right upper lobe, as discussed.  Mild cardiomegaly is suggested.     Please note that portions of this note were completed with a voice recognition program.  ARAM  VENESSA MCCAULEY JR, MD       Electronically Signed and Approved By: ARAM MCCAULEY JR, MD on 12/04/2022 at 5:23              XR Chest 1 View    Result Date: 12/4/2022  PROCEDURE: XR CHEST 1 VW  COMPARISON: None.  INDICATIONS: CHEST PAIN.  FINDINGS: A single AP upright portable chest radiograph is provided for review.  Atelectasis and/or infiltrate(s) is (are) seen in the left lung, especially in the left upper lobe and possibly the left infrahilar region.  The findings suggest pneumonia.  Consider imaging follow-up of the findings to ensure a benign progression (and to exclude an underlying malignant process).  Probably no acute infiltrate is seen on the right.  There may be borderline cardiac enlargement.  No pneumothorax is seen.  External artifacts obscure detail.  Chronic calcified granulomatous disease may involve the chest.  The thoracic aorta is atherosclerotic.  There are degenerative changes of the bilateral shoulders.       Left-sided pneumonia.     Please note that portions of this note were completed with a voice recognition program.  ARAM MCCAULEY JR, MD       Electronically Signed and Approved By: ARAM MCCAULEY JR, MD on 12/04/2022 at 3:51                Procedures:  ECG 12 Lead      Date/Time: 12/4/2022 3:09 AM  Performed by: Jovanny Vu MD  Authorized by: Jovanny Vu MD             Progress                            Medical Decision Making:  MDM  Number of Diagnoses or Management Options  Diagnosis management comments: Sepsis criteria was met in the emergency department and the Sepsis protocol (including antibiotic administration) was initiated.      SIRS criteria considered:   1.  Temperature > 100.4 or <98.6    2.  Heart Rate > 90    3.  Respiratory Rate > 22    4.  WBC > 12K or <4K.             Severe Sepsis:     Respiratory: Mechanical Ventilation or Bipap  Hypotension: SBP > 90 or MAP < 65  Renal: Creatinine > 2  Metabolic: Lactic Acid > 2  Hematologic: Platelets < 100K or INR > 1.5  Hepatic:  BILI  >  2  CNS: Sudden AMS     Septic Shock:     Severe Sepsis + Persistent hypotension or Lactic Acid > 4     Normal saline bolus, Antibiotics, and final disposition was based on these definitions.        Sepsis was recognized at 0330    Antibiotics were ordered.       30 cc/kg bolus was NOT indicated.       Patient did not receive the recommended 30ml/kg fluid bolus for sepsis because it would be harmful or detrimental to the patient.    The patient has concern for fluid overload.   The patient was ordered 1L of fluids.    Total Critical Care time of 35 minutes. Total critical care time documented does not include time spent on separately billed procedures for services of nurses or physician assistants. I personally saw and examined the patient. I have reviewed all diagnostic interpretations and treatment plans as written. I was present for the key portions of any procedures performed and the inclusive time noted in any critical care statement. Critical care time includes patient management by me, time spent at the patients bedside,  time to review lab and imaging results, discussing patient care, documentation in the medical record, and time spent with family or caregiver.         Final diagnoses:   Sepsis, due to unspecified organism, unspecified whether acute organ dysfunction present (HCC)   Pneumonia of left upper lobe due to infectious organism        Disposition:  ED Disposition     ED Disposition   Decision to Admit    Condition   --    Comment   Level of Care: Remote Telemetry [26]   Diagnosis: Sepsis, due to unspecified organism, unspecified whether acute organ dysfunction present (HCC) [[id number]]   Certification: I Certify That Inpatient Hospital Services Are Medically Necessary For Greater Than 2 Midnights               Dictated Utilizing Dragon Dictation    Documentation assistance provided by Brady Hodges acting as scribe for Jovanny Vu MD. Information recorded by the scribe was done at my  direction and has been verified and validated by me.          Brady Hodges  12/04/22 0324       Jovanny Vu MD  12/04/22 3190

## 2022-12-04 NOTE — H&P
[facility]  Date: 2022   Patient Name: Tung Smith  : 1970  MRN: [id number]  Primary Care Physician:  Provider, No Known  Date of admission: 2022    Subjective   Subjective     Chief Complaint: Shortness of breath, chest pain    HPI:    Tung Smith is a 52 y.o. male with a past medical history of diabetes, hypertension presented to the ED with complaints of cough, fevers, shortness of breath chest pain.  Patient started having fevers on last , the following day he started having cough that has progressed from productive yellowish sputum to occasional blood streaks.  Since last 4 days patient has been experiencing shortness of breath with exertion and left-sided chest pain with coughing or deep breath and came to the ED today. Denies nausea, vomiting, diarrhea, myalgias, weakness, numbness, tingling, headache, visual changes, unexpected weight loss.    In the ED, vital signs temperature 98.6, pulse 111, respiratory 26, blood pressure 140/69 on room air 94%.  Labs were significant for glucose 387, sodium 135, chloride 92, anion gap of 16.1, creatinine 1.76 baseline around 1.1, lactate 3.3, D-dimer 3.21, WBC 25.72, influenza negative, UA negative for nitrates, leukocytes.  Troponin x2 negative, normal proBNP.  EKG showed sinus tachycardia, no significant ST, T wave changes concerning for CAD.  Chest x-ray showed left-sided pneumonia, given the elevated D-dimer and chest pain and tachycardia CTA chest was done which showed no PE, left upper lobe pneumonia, mild acute infiltrate in the right upper lobe as well.  Received ceftriaxone and azithromycin in the ED.  The patient has been admitted inpatient for the management of the community-acquired pneumonia.    Personal History     Past Medical History:   Diagnosis Date   • Diabetes mellitus (HCC)    • Hyperlipidemia    • Hypertension    • Sleep apnea          History reviewed. No pertinent surgical  history.      History reviewed. No pertinent family history.      Social History     Socioeconomic History   • Marital status:    Tobacco Use   • Smoking status: Never   Substance and Sexual Activity   • Alcohol use: Never   • Drug use: Never         Home Medications:  atenolol, atorvastatin, glimepiride, lisinopril-hydrochlorothiazide, metFORMIN, and pantoprazole    Allergies:  No Known Allergies    Review of Systems   All other systems reviewed and negative except mentioned above in the HPI    Objective   Objective     Vitals:   Temp:  [99.6 °F (37.6 °C)] 99.6 °F (37.6 °C)  Heart Rate:  [105-117] 107  Resp:  [22-28] 22  BP: (131-167)/(64-84) 134/64    Physical Exam    Constitutional: Awake, alert, mild distress due to excessive cough   Eyes: Pupils equal, sclerae anicteric, no conjunctival injection   HENT: NCAT, mucous membranes moist   Neck: Supple, no thyromegaly, no lymphadenopathy, trachea midline   Respiratory: Bilateral air entry present, rhonchi present in the bilateral upper lobes, decreased breath sounds in the left upper lobe compared to rest of the lungs feels.  Nonlabored respirations    Cardiovascular: Tachycardia, regular no murmurs, rubs, or gallops, palpable pedal pulses bilaterally   Gastrointestinal: Positive bowel sounds, soft, nontender, nondistended   Musculoskeletal: No bilateral ankle edema, no clubbing or cyanosis to extremities   Psychiatric: Appropriate affect, cooperative   Neurologic: Oriented x 3, strength symmetric in all extremities, Cranial Nerves grossly intact to confrontation, speech clear   Skin: No rashes     Result Review    Result Review:  I have personally reviewed the results from the time of this admission to 12/4/2022 06:41 EST and agree with these findings:  [x]  Laboratory  [x]  Microbiology  [x]  Radiology  [x]  EKG/Telemetry   [x]  Cardiology/Vascular   []  Pathology  [x]  Old records  []  Other:    Imaging Results (Last 24 Hours)     Procedure Component Value  Units Date/Time    CT Chest With Contrast Diagnostic [[id number]] Collected: 12/04/22 0523     Updated: 12/04/22 0526    Narrative:      PROCEDURE: CT CHEST W CONTRAST DIAGNOSTIC     COMPARISON: None.     INDICATIONS: CHEST PAIN & SHORTNESS OF BREATH FOR 1 WEEK.     TECHNIQUE: After obtaining the patient's consent, 821 CT/CTA images were obtained with non-ionic   intravenous contrast material.       PROTOCOL:   Standard pulmonary arteriogram CTA imaging protocol performed.      RADIATION:   Total DLP: 464.5 mGy*cm.    Automated exposure control was utilized to minimize radiation dose.   CONTRAST: 100 mL Isovue 370 I.V.     FINDINGS: No pulmonary embolism.  Dense pulmonary consolidation involves left upper lobe.  Left   upper lobe pneumonia is suggested.  To a much lesser extent, a somewhat nodular ground-glass   opacity involves the right upper lobe, measuring about 2.6 x 2.2 x 2.3 cm in AP (anteroposterior),   transverse, and craniocaudal extent, respectively, as seen on image 51 of series 404, image 98 of   series 402, and image 53 of series 403.  This finding is noncalcified and non-cavitating.  It may   represent pneumonia.  Consider close interval clinical and imaging follow-up of the findings to   ensure a benign progression to exclude an underlying malignant process.  Minimal, if any, pleural   effusion is seen.  There may be a trace amount of pericardial effusion.  Mild cardiomegaly is   possible.  There are small-to-moderate-sized mediastinal lymph nodes.  They may be reactive in   nature.  Minimal residual thymic tissue is possible.  There is diffuse hepatic steatosis.    Hepatomegaly is possible.  The liver is partially imaged on the study.  No splenomegaly is   suspected.  No acute findings are seen within the partially imaged upper abdomen.  There may be   linear atelectasis and/or fibrosis in the left lower lobe.  To a lesser extent, similar findings   are seen contralaterally.  Degenerative changes  involve the imaged spine.  No acute fracture or   aggressive osseous lesion is appreciated.  There may be minimal coronary artery calcifications.    The central tracheobronchial tree is well aerated without filling defect.  There may be mild   peribronchial thickening, especially on the left.  No thoracic aortic aneurysm or dissection is   appreciated.       Impression:       No pulmonary embolism.  Left upper lobe pneumonia is present.  There may be mild acute   infiltrate(s) in the right upper lobe, as discussed.  Mild cardiomegaly is suggested.             Please note that portions of this note were completed with a voice recognition program.     ARAM MCCAULEY JR, MD         Electronically Signed and Approved By: ARAM MCCAULEY JR, MD on 12/04/2022 at 5:23                        XR Chest 1 View [[id number]] Collected: 12/04/22 0351     Updated: 12/04/22 0354    Narrative:      PROCEDURE: XR CHEST 1 VW     COMPARISON: None.     INDICATIONS: CHEST PAIN.     FINDINGS: A single AP upright portable chest radiograph is provided for review.  Atelectasis and/or   infiltrate(s) is (are) seen in the left lung, especially in the left upper lobe and possibly the   left infrahilar region.  The findings suggest pneumonia.  Consider imaging follow-up of the   findings to ensure a benign progression (and to exclude an underlying malignant process).  Probably   no acute infiltrate is seen on the right.  There may be borderline cardiac enlargement.  No   pneumothorax is seen.  External artifacts obscure detail.  Chronic calcified granulomatous disease   may involve the chest.  The thoracic aorta is atherosclerotic.  There are degenerative changes of   the bilateral shoulders.       Impression:       Left-sided pneumonia.              Please note that portions of this note were completed with a voice recognition program.     ARAM MCCAULEY JR, MD         Electronically Signed and Approved By: ARAM MCCAULEY JR, MD on 12/04/2022 at  3:51                               Assessment & Plan   Assessment / Plan     Assessment/Plan:  Sepsis secondary to pneumonia  Left upper lobe pneumonia  Mild right upper lobe pneumonia  TEO likely due to sepsis and dehydration, creatinine on admission 1.76, baseline April 2022 1.12  Diabetes  Hypertension    Plan  Admit to inpatient  Continue ceftriaxone and azithromycin  Check urine Legionella, urine strep, MRSA swab.  If the symptoms worsens or the patient does not improve with the current antibiotic treatment, consider broadening antibiotic coverage to include MRSA with vancomycin.  TEO likely in the setting of sepsis, dehydration.  Received 1 L IV fluids in the ED.  LR at 150 cc/h for 12 hours.  Monitor creatinine with daily labs.  Mucinex for cough suppression  Continue atenolol, atorvastatin, pantoprazole. Holding lisinopril and hydrochlorothiazide in setting of TEO  Glucose on admission was 387.  Ordered 5 units insulin regular, consistent carb diet, sliding scale insulin.  Adjust insulin based on the blood glucose levels.  Repeat lactic acid in 4 hours    DVT prophylaxis:  No DVT prophylaxis order currently exists.    CODE STATUS:    Level Of Support Discussed With: Patient  Code Status (Patient has no pulse and is not breathing): CPR (Attempt to Resuscitate)  Medical Interventions (Patient has pulse or is breathing): Full Support    Admission Status:  I believe this patient meets inpatient status.    Part of this note may be an electronic transcription/translation of spoken language to printed text using the Dragon Dictation System    Roxanne Moran MD

## 2022-12-05 ENCOUNTER — ANESTHESIA EVENT (OUTPATIENT)
Dept: GASTROENTEROLOGY | Facility: HOSPITAL | Age: 52
End: 2022-12-05

## 2022-12-05 ENCOUNTER — ANESTHESIA (OUTPATIENT)
Dept: GASTROENTEROLOGY | Facility: HOSPITAL | Age: 52
End: 2022-12-05

## 2022-12-05 PROBLEM — R13.19 ESOPHAGEAL DYSPHAGIA: Status: ACTIVE | Noted: 2022-12-04

## 2022-12-05 LAB
ALBUMIN SERPL-MCNC: 2.9 G/DL (ref 3.5–5.2)
ALBUMIN/GLOB SERPL: 0.7 G/DL
ALP SERPL-CCNC: 107 U/L (ref 39–117)
ALT SERPL W P-5'-P-CCNC: 20 U/L (ref 1–41)
ANION GAP SERPL CALCULATED.3IONS-SCNC: 9.9 MMOL/L (ref 5–15)
AST SERPL-CCNC: 18 U/L (ref 1–40)
BASOPHILS # BLD AUTO: 0.06 10*3/MM3 (ref 0–0.2)
BASOPHILS NFR BLD AUTO: 0.3 % (ref 0–1.5)
BILIRUB SERPL-MCNC: 0.4 MG/DL (ref 0–1.2)
BUN SERPL-MCNC: 22 MG/DL (ref 6–20)
BUN/CREAT SERPL: 22 (ref 7–25)
CALCIUM SPEC-SCNC: 9.1 MG/DL (ref 8.6–10.5)
CHLORIDE SERPL-SCNC: 98 MMOL/L (ref 98–107)
CO2 SERPL-SCNC: 28.1 MMOL/L (ref 22–29)
CREAT SERPL-MCNC: 1 MG/DL (ref 0.76–1.27)
DEPRECATED RDW RBC AUTO: 46.4 FL (ref 37–54)
EGFRCR SERPLBLD CKD-EPI 2021: 89.4 ML/MIN/1.73
EOSINOPHIL # BLD AUTO: 0.32 10*3/MM3 (ref 0–0.4)
EOSINOPHIL NFR BLD AUTO: 1.5 % (ref 0.3–6.2)
ERYTHROCYTE [DISTWIDTH] IN BLOOD BY AUTOMATED COUNT: 15.1 % (ref 12.3–15.4)
GLOBULIN UR ELPH-MCNC: 4.1 GM/DL
GLUCOSE BLDC GLUCOMTR-MCNC: 196 MG/DL (ref 70–99)
GLUCOSE BLDC GLUCOMTR-MCNC: 205 MG/DL (ref 70–99)
GLUCOSE BLDC GLUCOMTR-MCNC: 217 MG/DL (ref 70–99)
GLUCOSE BLDC GLUCOMTR-MCNC: 219 MG/DL (ref 70–99)
GLUCOSE SERPL-MCNC: 211 MG/DL (ref 65–99)
HCT VFR BLD AUTO: 33.2 % (ref 37.5–51)
HGB BLD-MCNC: 10.8 G/DL (ref 13–17.7)
IMM GRANULOCYTES # BLD AUTO: 0.09 10*3/MM3 (ref 0–0.05)
IMM GRANULOCYTES NFR BLD AUTO: 0.4 % (ref 0–0.5)
LYMPHOCYTES # BLD AUTO: 2.81 10*3/MM3 (ref 0.7–3.1)
LYMPHOCYTES NFR BLD AUTO: 13.4 % (ref 19.6–45.3)
MAGNESIUM SERPL-MCNC: 2.2 MG/DL (ref 1.6–2.6)
MCH RBC QN AUTO: 27.3 PG (ref 26.6–33)
MCHC RBC AUTO-ENTMCNC: 32.5 G/DL (ref 31.5–35.7)
MCV RBC AUTO: 84.1 FL (ref 79–97)
MONOCYTES # BLD AUTO: 1.66 10*3/MM3 (ref 0.1–0.9)
MONOCYTES NFR BLD AUTO: 7.9 % (ref 5–12)
NEUTROPHILS NFR BLD AUTO: 16.08 10*3/MM3 (ref 1.7–7)
NEUTROPHILS NFR BLD AUTO: 76.5 % (ref 42.7–76)
NRBC BLD AUTO-RTO: 0 /100 WBC (ref 0–0.2)
PHOSPHATE SERPL-MCNC: 3.2 MG/DL (ref 2.5–4.5)
PLATELET # BLD AUTO: 327 10*3/MM3 (ref 140–450)
PMV BLD AUTO: 11.3 FL (ref 6–12)
POTASSIUM SERPL-SCNC: 4.1 MMOL/L (ref 3.5–5.2)
PROT SERPL-MCNC: 7 G/DL (ref 6–8.5)
RBC # BLD AUTO: 3.95 10*6/MM3 (ref 4.14–5.8)
SODIUM SERPL-SCNC: 136 MMOL/L (ref 136–145)
WBC NRBC COR # BLD: 21.02 10*3/MM3 (ref 3.4–10.8)

## 2022-12-05 PROCEDURE — 36415 COLL VENOUS BLD VENIPUNCTURE: CPT | Performed by: STUDENT IN AN ORGANIZED HEALTH CARE EDUCATION/TRAINING PROGRAM

## 2022-12-05 PROCEDURE — 94660 CPAP INITIATION&MGMT: CPT

## 2022-12-05 PROCEDURE — 94799 UNLISTED PULMONARY SVC/PX: CPT

## 2022-12-05 PROCEDURE — 25010000002 AZITHROMYCIN PER 500 MG: Performed by: STUDENT IN AN ORGANIZED HEALTH CARE EDUCATION/TRAINING PROGRAM

## 2022-12-05 PROCEDURE — 0 CEFTRIAXONE PER 250 MG: Performed by: STUDENT IN AN ORGANIZED HEALTH CARE EDUCATION/TRAINING PROGRAM

## 2022-12-05 PROCEDURE — 80053 COMPREHEN METABOLIC PANEL: CPT | Performed by: STUDENT IN AN ORGANIZED HEALTH CARE EDUCATION/TRAINING PROGRAM

## 2022-12-05 PROCEDURE — 82962 GLUCOSE BLOOD TEST: CPT

## 2022-12-05 PROCEDURE — C1726 CATH, BAL DIL, NON-VASCULAR: HCPCS | Performed by: INTERNAL MEDICINE

## 2022-12-05 PROCEDURE — 84100 ASSAY OF PHOSPHORUS: CPT | Performed by: STUDENT IN AN ORGANIZED HEALTH CARE EDUCATION/TRAINING PROGRAM

## 2022-12-05 PROCEDURE — 25010000002 PROPOFOL 10 MG/ML EMULSION: Performed by: NURSE ANESTHETIST, CERTIFIED REGISTERED

## 2022-12-05 PROCEDURE — 0D738ZZ DILATION OF LOWER ESOPHAGUS, VIA NATURAL OR ARTIFICIAL OPENING ENDOSCOPIC: ICD-10-PCS | Performed by: INTERNAL MEDICINE

## 2022-12-05 PROCEDURE — 83735 ASSAY OF MAGNESIUM: CPT | Performed by: STUDENT IN AN ORGANIZED HEALTH CARE EDUCATION/TRAINING PROGRAM

## 2022-12-05 PROCEDURE — 63710000001 INSULIN LISPRO (HUMAN) PER 5 UNITS: Performed by: STUDENT IN AN ORGANIZED HEALTH CARE EDUCATION/TRAINING PROGRAM

## 2022-12-05 PROCEDURE — 43249 ESOPH EGD DILATION <30 MM: CPT | Performed by: INTERNAL MEDICINE

## 2022-12-05 PROCEDURE — 85025 COMPLETE CBC W/AUTO DIFF WBC: CPT | Performed by: STUDENT IN AN ORGANIZED HEALTH CARE EDUCATION/TRAINING PROGRAM

## 2022-12-05 PROCEDURE — 99232 SBSQ HOSP IP/OBS MODERATE 35: CPT | Performed by: INTERNAL MEDICINE

## 2022-12-05 PROCEDURE — 25010000002 ENOXAPARIN PER 10 MG: Performed by: STUDENT IN AN ORGANIZED HEALTH CARE EDUCATION/TRAINING PROGRAM

## 2022-12-05 RX ORDER — LIDOCAINE HYDROCHLORIDE 20 MG/ML
INJECTION, SOLUTION EPIDURAL; INFILTRATION; INTRACAUDAL; PERINEURAL AS NEEDED
Status: DISCONTINUED | OUTPATIENT
Start: 2022-12-05 | End: 2022-12-05 | Stop reason: SURG

## 2022-12-05 RX ORDER — PROPOFOL 10 MG/ML
VIAL (ML) INTRAVENOUS AS NEEDED
Status: DISCONTINUED | OUTPATIENT
Start: 2022-12-05 | End: 2022-12-05 | Stop reason: SURG

## 2022-12-05 RX ORDER — SODIUM CHLORIDE 0.9 % (FLUSH) 0.9 %
10 SYRINGE (ML) INJECTION AS NEEDED
Status: DISCONTINUED | OUTPATIENT
Start: 2022-12-05 | End: 2022-12-05 | Stop reason: HOSPADM

## 2022-12-05 RX ORDER — SODIUM CHLORIDE, SODIUM LACTATE, POTASSIUM CHLORIDE, CALCIUM CHLORIDE 600; 310; 30; 20 MG/100ML; MG/100ML; MG/100ML; MG/100ML
INJECTION, SOLUTION INTRAVENOUS CONTINUOUS PRN
Status: DISCONTINUED | OUTPATIENT
Start: 2022-12-05 | End: 2022-12-05 | Stop reason: SURG

## 2022-12-05 RX ORDER — LIDOCAINE HYDROCHLORIDE 10 MG/ML
0.5 INJECTION, SOLUTION INFILTRATION; PERINEURAL ONCE AS NEEDED
Status: DISCONTINUED | OUTPATIENT
Start: 2022-12-05 | End: 2022-12-05 | Stop reason: HOSPADM

## 2022-12-05 RX ORDER — SODIUM CHLORIDE 9 MG/ML
1000 INJECTION, SOLUTION INTRAVENOUS CONTINUOUS
Status: DISCONTINUED | OUTPATIENT
Start: 2022-12-05 | End: 2022-12-06 | Stop reason: HOSPADM

## 2022-12-05 RX ADMIN — HYDROCODONE POLISTIREX AND CHLORPHENIRAMINE POLISTIREX 2.5 ML: 10; 8 SUSPENSION, EXTENDED RELEASE ORAL at 23:07

## 2022-12-05 RX ADMIN — INSULIN LISPRO 3 UNITS: 100 INJECTION, SOLUTION INTRAVENOUS; SUBCUTANEOUS at 09:37

## 2022-12-05 RX ADMIN — CEFTRIAXONE SODIUM 2 G: 2 INJECTION, SOLUTION INTRAVENOUS at 09:38

## 2022-12-05 RX ADMIN — ACETAMINOPHEN 650 MG: 325 TABLET ORAL at 20:45

## 2022-12-05 RX ADMIN — PROPOFOL 250 MCG/KG/MIN: 10 INJECTION, EMULSION INTRAVENOUS at 07:41

## 2022-12-05 RX ADMIN — Medication: at 09:38

## 2022-12-05 RX ADMIN — ATORVASTATIN CALCIUM 40 MG: 40 TABLET, FILM COATED ORAL at 09:38

## 2022-12-05 RX ADMIN — ENOXAPARIN SODIUM 40 MG: 100 INJECTION SUBCUTANEOUS at 09:38

## 2022-12-05 RX ADMIN — PROPOFOL 80 MG: 10 INJECTION, EMULSION INTRAVENOUS at 07:41

## 2022-12-05 RX ADMIN — Medication 10 ML: at 20:37

## 2022-12-05 RX ADMIN — ATENOLOL 25 MG: 25 TABLET ORAL at 07:04

## 2022-12-05 RX ADMIN — LIDOCAINE HYDROCHLORIDE 100 MG: 20 INJECTION, SOLUTION EPIDURAL; INFILTRATION; INTRACAUDAL; PERINEURAL at 07:41

## 2022-12-05 RX ADMIN — INSULIN LISPRO 3 UNITS: 100 INJECTION, SOLUTION INTRAVENOUS; SUBCUTANEOUS at 12:18

## 2022-12-05 RX ADMIN — Medication 10 ML: at 09:38

## 2022-12-05 RX ADMIN — SODIUM CHLORIDE, POTASSIUM CHLORIDE, SODIUM LACTATE AND CALCIUM CHLORIDE: 600; 310; 30; 20 INJECTION, SOLUTION INTRAVENOUS at 07:38

## 2022-12-05 RX ADMIN — INSULIN LISPRO 2 UNITS: 100 INJECTION, SOLUTION INTRAVENOUS; SUBCUTANEOUS at 17:12

## 2022-12-05 RX ADMIN — SODIUM CHLORIDE 1000 ML: 9 INJECTION, SOLUTION INTRAVENOUS at 09:39

## 2022-12-05 RX ADMIN — AZITHROMYCIN MONOHYDRATE 500 MG: 500 INJECTION, POWDER, LYOPHILIZED, FOR SOLUTION INTRAVENOUS at 11:05

## 2022-12-05 NOTE — ANESTHESIA PREPROCEDURE EVALUATION
Anesthesia Evaluation     Patient summary reviewed and Nursing notes reviewed   no history of anesthetic complications:  NPO Solid Status: > 8 hours  NPO Liquid Status: > 2 hours           Airway   Mallampati: I  TM distance: >3 FB  Neck ROM: full  No difficulty expected  Dental      Pulmonary - normal exam    breath sounds clear to auscultation  (+) sleep apnea on CPAP,   Cardiovascular - normal exam  Exercise tolerance: good (4-7 METS)    Rhythm: regular  Rate: normal    (+) hypertension well controlled, hyperlipidemia,       Neuro/Psych- negative ROS  GI/Hepatic/Renal/Endo    (+)   diabetes mellitus type 2 well controlled,     Musculoskeletal (-) negative ROS    Abdominal    Substance History - negative use     OB/GYN negative ob/gyn ROS         Other - negative ROS       ROS/Med Hx Other: PAT Nursing Notes unavailable.                   Anesthesia Plan    ASA 3     general     (Patient understands anesthesia not responsible for dental damage.)  intravenous induction     Anesthetic plan, risks, benefits, and alternatives have been provided, discussed and informed consent has been obtained with: patient.  Pre-procedure education provided  Plan discussed with CRNA.        CODE STATUS:    Level Of Support Discussed With: Patient  Code Status (Patient has no pulse and is not breathing): CPR (Attempt to Resuscitate)  Medical Interventions (Patient has pulse or is breathing): Full Support

## 2022-12-05 NOTE — H&P (VIEW-ONLY)
Chief Complaint  Cough, Fever, and Chest Pain    History of Present Illness  Tung Smith is a 52 y.o. male with history of diabetes, high cholesterol, hypertension, sleep apnea, GERD, esophageal stricture who presents to [address] on referral from No ref. provider found for a gastroenterology evaluation of dysphagia.  Patient has been admitted with pneumonia there is concern the patient may have aspirated.  Patient continued to have trouble swallowing intermittently to solids and liquids both.  He has history of EGD and dilatation in the past.  Patient denies any chest pain, abdominal pain, nausea vomiting, heartburn, constipation, diarrhea, bleeding, black tarry stools        Labs Result Review Imaging    Past Medical History:   Diagnosis Date   • Diabetes mellitus (HCC)    • Hyperlipidemia    • Hypertension    • Sleep apnea        History reviewed. No pertinent surgical history.      Current Facility-Administered Medications:   •  !Patient Home Medications Stored in Pharmacy, , Does not apply, BID, Peter Gonzalez MD  •  acetaminophen (TYLENOL) tablet 650 mg, 650 mg, Oral, Q4H PRN, Roxanne Moran MD, 650 mg at 12/04/22 1558  •  atenolol (TENORMIN) tablet 25 mg, 25 mg, Oral, Daily, Roxanne Moran MD, 25 mg at 12/04/22 0919  •  atorvastatin (LIPITOR) tablet 40 mg, 40 mg, Oral, Daily, Roxanne Moran MD, 40 mg at 12/04/22 0919  •  [START ON 12/5/2022] AZITHROMYCIN 500 MG/250 ML 0.9% NS IVPB (vial-mate), 500 mg, Intravenous, Q24H, Roxanne Moran MD  •  [START ON 12/5/2022] cefTRIAXone (ROCEPHIN) IVPB 2 g, 2 g, Intravenous, Q24H, Roxanne Moran MD  •  dextrose (D50W) (25 g/50 mL) IV injection 25 g, 25 g, Intravenous, Q15 Min PRN, Roxanne Moran MD  •  dextrose (GLUTOSE) oral gel 15 g, 15 g, Oral, Q15 Min PRN, Roxanne Moran MD  •  Enoxaparin Sodium (LOVENOX) syringe 40 mg, 40 mg, Subcutaneous, Daily, Roxanne Moran MD, 40 mg at 12/04/22 0928  •   glucagon (human recombinant) (GLUCAGEN DIAGNOSTIC) injection 1 mg, 1 mg, Intramuscular, Q15 Min PRN, Roxanne Moran MD  •  guaifenesin-dextromethorphan (MUCINEX DM) tablet 1 tablet, 1 tablet, Oral, BID PRN, Roxanne Moran MD  •  HYDROcod Polst-CPM Polst ER (TUSSIONEX PENNKINETIC) 10-8 MG/5ML ER suspension 2.5 mL, 2.5 mL, Oral, Q12H PRN, Roxanne Moran MD, 2.5 mL at 12/04/22 0927  •  Insulin Lispro (humaLOG) injection 2-7 Units, 2-7 Units, Subcutaneous, TID With Meals, Roxanne Moran MD, 3 Units at 12/04/22 1747  •  ipratropium-albuterol (DUO-NEB) nebulizer solution 3 mL, 3 mL, Nebulization, Q6H PRN, Roxanne Moran MD, 3 mL at 12/04/22 0843  •  nitroglycerin (NITROSTAT) SL tablet 0.4 mg, 0.4 mg, Sublingual, Q5 Min PRN, Roxanne Moran MD  •  pantoprazole (PROTONIX) EC tablet 40 mg, 40 mg, Oral, Q AM, Roxanne Moran MD, 40 mg at 12/04/22 0919  •  potassium chloride (MICRO-K) CR capsule 40 mEq, 40 mEq, Oral, Q4H, Peter Gonzalez MD, 40 mEq at 12/04/22 1748  •  sodium chloride 0.9 % flush 10 mL, 10 mL, Intravenous, PRN, Roxanne Moran MD  •  sodium chloride 0.9 % flush 10 mL, 10 mL, Intravenous, PRN, Roxanne Moran MD  •  sodium chloride 0.9 % flush 10 mL, 10 mL, Intravenous, PRN, Roxanne Moran MD  •  sodium chloride 0.9 % flush 10 mL, 10 mL, Intravenous, Q12H, Roxanne Moran MD, 10 mL at 12/04/22 0928  •  sodium chloride 0.9 % infusion 40 mL, 40 mL, Intravenous, PRN, Roxanne Moran MD     No Known Allergies    History reviewed. No pertinent family history.     Social History     Social History Narrative   • Not on file   Social history neck smoking, alcohol abuse, drugs  Family is negative GI malignancy    Immunization:  Immunization History   Administered Date(s) Administered   • COVID-19 (PFIZER) PURPLE CAP 05/17/2021, 06/07/2021   • Hep B, Unspecified 01/07/2003   • Hepatitis B 05/03/2019, 07/09/2019   • Tdap 09/30/2007, 02/27/2017        Objective     Review  "of Systems   10 system review is negative except as mentioned HPI  Vital Signs:   /75 (BP Location: Left arm, Patient Position: Lying)   Pulse 87   Temp 100.3 °F (37.9 °C) (Oral)   Resp 16   Ht 160 cm (63\")   Wt 93.1 kg (205 lb 4 oz)   SpO2 92%   BMI 36.36 kg/m²       Physical Exam  Constitutional:       General: He is awake. He is not in acute distress.     Appearance: Normal appearance. He is well-developed and well-groomed.   HENT:      Head: Normocephalic and atraumatic.      Mouth/Throat:      Mouth: Mucous membranes are moist.      Comments: Kwaku dental hygiene is good  Eyes:      General: Lids are normal.      Conjunctiva/sclera: Conjunctivae normal.      Pupils: Pupils are equal, round, and reactive to light.   Neck:      Thyroid: No thyroid mass.      Trachea: Trachea normal.   Cardiovascular:      Rate and Rhythm: Normal rate and regular rhythm.      Heart sounds: Normal heart sounds.   Pulmonary:      Effort: Pulmonary effort is normal.      Breath sounds: Normal breath sounds and air entry.   Abdominal:      General: Abdomen is flat. Bowel sounds are normal. There is no distension.      Palpations: Abdomen is soft. There is no mass.      Tenderness: There is no abdominal tenderness. There is no guarding.   Musculoskeletal:      Cervical back: Neck supple.      Right lower leg: No edema.      Left lower leg: No edema.   Skin:     General: Skin is warm and moist.      Coloration: Skin is not cyanotic, jaundiced or pale.      Findings: No rash.      Nails: There is no clubbing.   Neurological:      Mental Status: He is alert and oriented to person, place, and time.   Psychiatric:         Attention and Perception: Attention normal.         Mood and Affect: Mood and affect normal.         Speech: Speech normal.         Labs:  Results from last 7 days   Lab Units 12/04/22  1423 12/04/22  0307   WBC 10*3/mm3 26.41* 25.72*   HEMOGLOBIN g/dL 11.1* 12.1*   HEMATOCRIT % 33.2* 36.2*   PLATELETS 10*3/mm3 " 313 342      Results from last 7 days   Lab Units 12/04/22  1423 12/04/22  0307   SODIUM mmol/L 134* 135*   POTASSIUM mmol/L 3.3* 3.6   CHLORIDE mmol/L 94* 92*   CO2 mmol/L 27.2 26.9   BUN mg/dL 18 22*   CREATININE mg/dL 1.19 1.76*   CALCIUM mg/dL 8.8 9.7   BILIRUBIN mg/dL  --  0.8   ALK PHOS U/L  --  130*   ALT (SGPT) U/L  --  27   AST (SGOT) U/L  --  21   GLUCOSE mg/dL 276* 387*             Assessment & Plan:  Principal Problem:    Sepsis, due to unspecified organism, unspecified whether acute organ dysfunction present (HCC)  Esophageal dysphagia.  Lying we will do upper endoscopy in the morning.  Risk and benefits discussed with patient    Patient was given instructions and counseling regarding his condition or for health maintenance advice. Please see specific information pulled into the AVS if appropriate.        Signed:  Tesfaye Ochoa MD  12/04/22  20:05 EST

## 2022-12-05 NOTE — CONSULTS
Discharge Planning Assessment  SAUL [facility]     Patient Name: Tung Smith  MRN: [id number]  Today's Date: 12/5/2022    Admit Date: 12/4/2022    Plan: Patient is alert and oriented, verified demographics, introduced self and discussed discharge planning. Patient is from [place], is a . Truck is currently at [facility] off [address] [place]. Will need transportation to truck once able to discharge hospital. RN CM discussed with patient that a cab can be called, patient does have money to pay for cab, truck is approximately 25 miles from hospital. Patient is agreeable to having any new prescriptions filled at hospital pharmacy so medications can be delivered to bedside day of discharge. Patient denies having any additional needs/concerns at this time. Will continue to follow if any needs arise.   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 [facility] off [address]       Transition Planning    Patient/Family Anticipates Transition to home    Patient/Family Anticipated Services at Transition none    Transportation Anticipated public transportation       Discharge Needs Assessment    Readmission Within the Last 30 Days no previous admission in last 30 days    Equipment Currently Used at Home cpap;glucometer    Concerns to be Addressed discharge planning    Anticipated Changes Related to Illness none    Equipment Needed After Discharge none               Discharge Plan     Row Name 12/05/22 2783       Plan     Plan Patient is alert and oriented, verified demographics, introduced self and discussed discharge planning. Patient is from [place], is a . Truck is currently at [facility] off [address]. Will need transportation to truck once able to discharge hospital. RN CM discussed with patient that a cab can be called, patient does have money to pay for cab, truck is approximately 25 miles from hospital. Patient is agreeable to having any new prescriptions filled at hospital pharmacy so medications can be delivered to bedside day of discharge. Patient denies having any additional needs/concerns at this time. Will continue to follow if any needs arise.              Continued Care and Services - Admitted Since 12/4/2022    Coordination has not been started for this encounter.       Expected Discharge Date and Time     Expected Discharge Date Expected Discharge Time    Dec 6, 2022          Demographic Summary     Row Name 12/05/22 1107       General Information    Admission Type inpatient    Arrived From emergency department    Reason for Consult discharge planning    Preferred Language English               Functional Status     Row Name 12/05/22 1108       Functional Status    Usual Activity Tolerance good    Current Activity Tolerance moderate       Assessment of Health Literacy    How often do you have someone help you read hospital materials? Occasionally    How often do you have problems learning about your medical condition because of difficulty understanding written information? Occasionally    How often do you have a problem understanding what is told to you about your medical condition? Occasionally    How confident are you filling out medical forms by yourself? Quite a bit    Health Literacy Good       Functional Status, IADL    Medications independent    Meal Preparation independent    Housekeeping independent    Laundry independent    Shopping independent       Mental Status    General  Appearance WDL WDL       Mental Status Summary    Recent Changes in Mental Status/Cognitive Functioning no changes               Psychosocial    No documentation.                Abuse/Neglect    No documentation.                Legal    No documentation.                Substance Abuse    No documentation.                Patient Forms    No documentation.                   Emily Whiteside RN

## 2022-12-05 NOTE — PLAN OF CARE
Goal Outcome Evaluation:  Plan of Care Reviewed With: patient        Progress: improving    VSS No complaints of pain or discomfort this shift. Bronc was completed this am and patient is doing very well. No concerns at this time

## 2022-12-05 NOTE — ANESTHESIA POSTPROCEDURE EVALUATION
Patient: Tung Smith    Procedure Summary     Date: 12/05/22 Room / Location: [facility] ENDOSCOPY 4 / [facility] ENDOSCOPY    Anesthesia Start: 0738 Anesthesia Stop: 0759    Procedure: ESOPHAGOGASTRODUODENOSCOPY WITH ESOPHAGEAL BALLOON DILATATION Diagnosis:       Esophageal dysphagia      (Esophageal dysphagia [R13.19])    Surgeons: Abdoulaye Ochoa MD Provider: Vinnie Ridley MD    Anesthesia Type: general ASA Status: 3          Anesthesia Type: general    Vitals  Vitals Value Taken Time   /83 12/05/22 0813   Temp 36.7 °C (98 °F) 12/05/22 0813   Pulse 76 12/05/22 0817   Resp 20 12/05/22 0813   SpO2 95 % 12/05/22 0817   Vitals shown include unvalidated device data.        Post Anesthesia Care and Evaluation    Patient location during evaluation: bedside  Patient participation: complete - patient participated  Level of consciousness: awake  Pain management: adequate    Airway patency: patent  Anesthetic complications: No anesthetic complications  PONV Status: none  Cardiovascular status: acceptable and stable  Respiratory status: acceptable  Hydration status: acceptable    Comments: An Anesthesiologist personally participated in the most demanding procedures (including induction and emergence if applicable) in the anesthesia plan, monitored the course of anesthesia administration at frequent intervals and remained physically present and available for immediate diagnosis and treatment of emergencies.

## 2022-12-05 NOTE — PLAN OF CARE
Goal Outcome Evaluation:  Plan of Care Reviewed With: patient        Progress: improving  Outcome Evaluation: Patient overall feeling better. NPO after midnight. Low grade fever today and Tylenol administered. Continue to monitor.

## 2022-12-05 NOTE — PROGRESS NOTES
[facility]   Hospitalist Progress Note  Date: 2022  Patient Name: Tung Smith  : 1970  MRN: [id number]  Date of admission: 2022      Subjective   Subjective     Chief Complaint:   Shortness of breath, chest pain, cough    Summary:   Tung Smith is a 52 y.o. male with a past medical history of diabetes, hypertension presented to the ED with complaints of cough, fevers, shortness of breath, and pleuritic chest pain.  Patient started having fevers on last , the following day he started having cough that has progressed from productive yellowish sputum to occasional blood streaks.    For the previous 4 days patient has been experiencing shortness of breath with exertion and left-sided chest pain with coughing or deep breath and came to the ED on the morning of 2022. Denies nausea, vomiting, diarrhea, myalgias, weakness, numbness, tingling, headache, visual changes, unexpected weight loss.     In the ED, vital signs temperature 98.6, pulse 111, respiratory 26, blood pressure 140/69 on room air 94%.  Labs were significant for glucose 387, sodium 135, chloride 92, anion gap of 16.1, creatinine 1.76 baseline around 1.1, lactate 3.3, D-dimer 3.21, WBC 25.72, influenza negative, UA negative for nitrates, leukocytes.  Troponin x2 negative, normal proBNP.  EKG showed sinus tachycardia, no significant ST, T wave changes concerning for CAD.  Chest x-ray showed left-sided pneumonia, given the elevated D-dimer and chest pain and tachycardia CTA chest was done which showed no PE, left upper lobe pneumonia, mild acute infiltrate in the right upper lobe as well.  Received ceftriaxone and azithromycin in the ED.  The patient has been admitted inpatient for the management of the community-acquired pneumonia.    Patient with a history of esophageal dilations with gastroenterology.  Patient is a resident in [place], currently a  transiently here in [place].  All of his records are in  [place].  Patient states he has had at least 2 or 3 dilations in the past, last was around 1 year prior.  Given patient's left upper lobe pneumonia and possible swallowing difficulties we will have GI see patient.    Interval Followup:   Gastroenterologist was able to see patient, patient had an EGD with dilation of Schatzki's ring this morning.  Schatzki ring was described as moderate.  Patient now starting to eat and gaining his strength.  Plan to discharge patient tomorrow.  Patient's reticulocyte is currently at a [facility] off [address] around 25 minutes south.    Review of Systems   All systems were reviewed and negative except for: Minimal pleuritic chest pain    Objective   Objective     Vitals:   Temp:  [97.5 °F (36.4 °C)-100.3 °F (37.9 °C)] 98.8 °F (37.1 °C)  Heart Rate:  [70-87] 78  Resp:  [16-30] 20  BP: (123-148)/(73-94) 142/84  Flow (L/min):  [2] 2   Physical Exam    Constitutional: Awake, alert, no acute distress   Eyes: Pupils equal, sclerae anicteric, no conjunctival injection   HENT: NCAT, mucous membranes moist   Neck: Supple, no thyromegaly, no lymphadenopathy, trachea midline   Respiratory: Minimal diminished breath sounds to left upper lobe, no wheezing no rhonchi   Cardiovascular: RRR, no murmurs, rubs, or gallops, palpable pedal pulses bilaterally   Gastrointestinal: Positive bowel sounds, soft, nontender, nondistended   Musculoskeletal: No bilateral ankle edema, no clubbing or cyanosis to extremities   Psychiatric: Appropriate affect, cooperative   Neurologic: Oriented x 3, strength symmetric in all extremities, Cranial Nerves grossly intact to confrontation, speech clear   Skin: No rashes     Result Review    Result Review:  I have personally reviewed the results from 12/5/2022 and agree with these findings:  [x]  Laboratory  []  Microbiology  [x]  Radiology  []  EKG/Telemetry   []  Cardiology/Vascular   []  Pathology  [x]  Old records  []  Other:    Assessment & Plan   Assessment  / Plan     Assessment/Plan:  Sepsis secondary to pneumonia  Lactic acidosis, clinically significant  Left upper lobe pneumonia resulting in the above  TEO likely secondary to sepsis and dehydration  Diabetes  Hypertension  History of esophageal strictures requiring dilation    Plan:  Patient remains admitted to hospital for further care  Gastroenterologist consulted, appreciate assistance  Patient had EGD with dilation of moderate Schatzki ring morning of 12/5/2022  Continue on ceftriaxone azithromycin  Patient's renal function continues to improve  Potassium now within normal limits  Lactate has trended to normal  Continue to monitor glucose, increase long-acting and sliding scale if needed  Troponins were trended, all normal.  Chest pain appears to be pleuritic    Discussed plan with RN, gastroenterologist,     Disposition: Plan to discharge patient tomorrow    DVT prophylaxis:  Medical DVT prophylaxis orders are present.    CODE STATUS:   Level Of Support Discussed With: Patient  Code Status (Patient has no pulse and is not breathing): CPR (Attempt to Resuscitate)  Medical Interventions (Patient has pulse or is breathing): Full Support

## 2022-12-06 ENCOUNTER — READMISSION MANAGEMENT (OUTPATIENT)
Dept: CALL CENTER | Facility: HOSPITAL | Age: 52
End: 2022-12-06

## 2022-12-06 VITALS
RESPIRATION RATE: 18 BRPM | WEIGHT: 205.25 LBS | BODY MASS INDEX: 36.37 KG/M2 | SYSTOLIC BLOOD PRESSURE: 168 MMHG | HEART RATE: 77 BPM | HEIGHT: 63 IN | OXYGEN SATURATION: 95 % | DIASTOLIC BLOOD PRESSURE: 93 MMHG | TEMPERATURE: 98.8 F

## 2022-12-06 LAB
ANION GAP SERPL CALCULATED.3IONS-SCNC: 11 MMOL/L (ref 5–15)
BUN SERPL-MCNC: 21 MG/DL (ref 6–20)
BUN/CREAT SERPL: 21.6 (ref 7–25)
CALCIUM SPEC-SCNC: 9.2 MG/DL (ref 8.6–10.5)
CHLORIDE SERPL-SCNC: 99 MMOL/L (ref 98–107)
CO2 SERPL-SCNC: 26 MMOL/L (ref 22–29)
CREAT SERPL-MCNC: 0.97 MG/DL (ref 0.76–1.27)
DEPRECATED RDW RBC AUTO: 45.3 FL (ref 37–54)
EGFRCR SERPLBLD CKD-EPI 2021: 92.8 ML/MIN/1.73
ERYTHROCYTE [DISTWIDTH] IN BLOOD BY AUTOMATED COUNT: 14.9 % (ref 12.3–15.4)
GLUCOSE BLDC GLUCOMTR-MCNC: 204 MG/DL (ref 70–99)
GLUCOSE SERPL-MCNC: 204 MG/DL (ref 65–99)
HCT VFR BLD AUTO: 34.5 % (ref 37.5–51)
HGB BLD-MCNC: 11.5 G/DL (ref 13–17.7)
MAGNESIUM SERPL-MCNC: 2 MG/DL (ref 1.6–2.6)
MCH RBC QN AUTO: 27.5 PG (ref 26.6–33)
MCHC RBC AUTO-ENTMCNC: 33.3 G/DL (ref 31.5–35.7)
MCV RBC AUTO: 82.5 FL (ref 79–97)
PLATELET # BLD AUTO: 353 10*3/MM3 (ref 140–450)
PMV BLD AUTO: 11 FL (ref 6–12)
POTASSIUM SERPL-SCNC: 3.9 MMOL/L (ref 3.5–5.2)
RBC # BLD AUTO: 4.18 10*6/MM3 (ref 4.14–5.8)
SODIUM SERPL-SCNC: 136 MMOL/L (ref 136–145)
WBC NRBC COR # BLD: 13.17 10*3/MM3 (ref 3.4–10.8)

## 2022-12-06 PROCEDURE — 82962 GLUCOSE BLOOD TEST: CPT

## 2022-12-06 PROCEDURE — 0 CEFTRIAXONE PER 250 MG: Performed by: INTERNAL MEDICINE

## 2022-12-06 PROCEDURE — 94799 UNLISTED PULMONARY SVC/PX: CPT

## 2022-12-06 PROCEDURE — 80048 BASIC METABOLIC PNL TOTAL CA: CPT | Performed by: INTERNAL MEDICINE

## 2022-12-06 PROCEDURE — 83735 ASSAY OF MAGNESIUM: CPT | Performed by: INTERNAL MEDICINE

## 2022-12-06 PROCEDURE — 25010000002 AZITHROMYCIN PER 500 MG: Performed by: STUDENT IN AN ORGANIZED HEALTH CARE EDUCATION/TRAINING PROGRAM

## 2022-12-06 PROCEDURE — 99239 HOSP IP/OBS DSCHRG MGMT >30: CPT | Performed by: INTERNAL MEDICINE

## 2022-12-06 PROCEDURE — 85027 COMPLETE CBC AUTOMATED: CPT | Performed by: INTERNAL MEDICINE

## 2022-12-06 PROCEDURE — 63710000001 INSULIN LISPRO (HUMAN) PER 5 UNITS: Performed by: INTERNAL MEDICINE

## 2022-12-06 PROCEDURE — 25010000002 ENOXAPARIN PER 10 MG: Performed by: INTERNAL MEDICINE

## 2022-12-06 RX ORDER — AMOXICILLIN AND CLAVULANATE POTASSIUM 875; 125 MG/1; MG/1
1 TABLET, FILM COATED ORAL 2 TIMES DAILY
Qty: 10 TABLET | Refills: 0 | Status: SHIPPED | OUTPATIENT
Start: 2022-12-06 | End: 2022-12-11

## 2022-12-06 RX ADMIN — Medication 10 ML: at 09:52

## 2022-12-06 RX ADMIN — PANTOPRAZOLE SODIUM 40 MG: 40 TABLET, DELAYED RELEASE ORAL at 05:58

## 2022-12-06 RX ADMIN — ENOXAPARIN SODIUM 40 MG: 100 INJECTION SUBCUTANEOUS at 08:30

## 2022-12-06 RX ADMIN — ATORVASTATIN CALCIUM 40 MG: 40 TABLET, FILM COATED ORAL at 08:30

## 2022-12-06 RX ADMIN — CEFTRIAXONE SODIUM 2 G: 2 INJECTION, SOLUTION INTRAVENOUS at 05:58

## 2022-12-06 RX ADMIN — ATENOLOL 25 MG: 25 TABLET ORAL at 09:52

## 2022-12-06 RX ADMIN — INSULIN LISPRO 3 UNITS: 100 INJECTION, SOLUTION INTRAVENOUS; SUBCUTANEOUS at 08:30

## 2022-12-06 RX ADMIN — AZITHROMYCIN MONOHYDRATE 500 MG: 500 INJECTION, POWDER, LYOPHILIZED, FOR SOLUTION INTRAVENOUS at 06:36

## 2022-12-06 NOTE — DISCHARGE SUMMARY
[facility]         HOSPITALIST  DISCHARGE SUMMARY    Patient Name: Tung Smith  : 1970  MRN: [id number]    Date of Admission: 2022  Date of Discharge: 2022  Primary Care Physician: Provider, No Known    Consults     Date and Time Order Name Status Description    2022  4:06 PM Inpatient Gastroenterology Consult Completed     2022  5:36 AM Hospitalist (on-call MD unless specified)            Active and Resolved Hospital Problems:  Active Hospital Problems    Diagnosis POA   • **Sepsis, due to unspecified organism, unspecified whether acute organ dysfunction present (HCC) [A41.9] Yes   • Esophageal dysphagia [R13.19] Unknown      Resolved Hospital Problems   No resolved problems to display.       Hospital Course     Hospital Course:  Tung Smith is a 52 y.o. male with a past medical history of diabetes, hypertension presented to the ED with complaints of cough, fevers, shortness of breath, and pleuritic chest pain. For the previous 4 days patient has been experiencing shortness of breath with exertion and left-sided chest pain with coughing or deep breath and came to the ED on the morning of 2022.  In the ED, chest x-ray showed left-sided pneumonia, given the elevated D-dimer and chest pain and tachycardia CTA chest was done which showed no PE, left upper lobe pneumonia, mild acute infiltrate in the right upper lobe as well.  Admitted for further care and started on antibiotics.  Weaned off of oxygen quickly.Patient with a history of esophageal dilations with gastroenterology.  Patient is a resident in [place], currently a  transiently here in [place].  All of his records are in [place].  Patient states he has had at least 2 or 3 dilations in the past, last was around 1 year prior.    Gastroenterology consult during his hospital stay and performed EGD with dilation of Schatzki's ring on .  Symptomatically, patient was significantly improved  and was discharged home in stable condition on 12/6/2022.  He will complete a course of antibiotics at discharge and follow-up with PCP in 1 week.    Day of Discharge     Vital Signs:  Temp:  [98.6 °F (37 °C)-99.9 °F (37.7 °C)] 98.8 °F (37.1 °C)  Heart Rate:  [74-86] 77  Resp:  [18-20] 18  BP: (136-168)/(74-93) 168/93  Physical Exam:   Gen: NAD, WDWN  ENT: PERRL, EOMI   CV: RRR no MRG  Pulm: CTAB, no w/r/r  GI: Abd soft, NTND, +bs  Neuro: Moving all extremities spontaneously, CN II-XII grossly intact   Psych: A&O*3, normal mood and affect  Skin: No lesions or rashes noted      Discharge Details        Discharge Medications      New Medications      Instructions Start Date   amoxicillin-clavulanate 875-125 MG per tablet  Commonly known as: Augmentin   1 tablet, Oral, 2 Times Daily         Continue These Medications      Instructions Start Date   atenolol 25 MG tablet  Commonly known as: TENORMIN   1 tablet, Oral, Daily      atorvastatin 40 MG tablet  Commonly known as: LIPITOR   40 mg, Oral      glimepiride 2 MG tablet  Commonly known as: AMARYL   1 mg, Oral      lisinopril-hydrochlorothiazide 20-25 MG per tablet  Commonly known as: PRINZIDE,ZESTORETIC   1 tablet, Oral, Daily      metFORMIN 1000 MG tablet  Commonly known as: GLUCOPHAGE   1,000 mg, Oral      pantoprazole 40 MG EC tablet  Commonly known as: PROTONIX   20-40 mg, Oral, Daily, 1 hr before a meal             No Known Allergies    Discharge Disposition:  Home or Self Care    Diet:  Hospital:  Diet Order   Procedures   • Diet: Regular/House Diet, Diabetic Diets; Consistent Carbohydrate; Texture: Regular Texture (IDDSI 7); Fluid Consistency: Thin (IDDSI 0)       Discharge Activity:   Activity Instructions     Activity as Tolerated            CODE STATUS:  Code Status and Medical Interventions:   Ordered at: 12/04/22 0602     Level Of Support Discussed With:    Patient     Code Status (Patient has no pulse and is not breathing):    CPR (Attempt to Resuscitate)      Medical Interventions (Patient has pulse or is breathing):    Full Support         No future appointments.    Additional Instructions for the Follow-ups that You Need to Schedule     Discharge Follow-up with PCP   As directed       Currently Documented PCP:    Provider, No Known    PCP Phone Number:    None     Follow Up Details: 3-5 days               Pertinent  and/or Most Recent Results     PROCEDURES:   None    LAB RESULTS:      Lab 12/06/22  0531 12/05/22  0505 12/04/22  1423 12/04/22  1108 12/04/22  0632 12/04/22  0307   WBC 13.17* 21.02* 26.41*  --   --  25.72*   HEMOGLOBIN 11.5* 10.8* 11.1*  --   --  12.1*   HEMATOCRIT 34.5* 33.2* 33.2*  --   --  36.2*   PLATELETS 353 327 313  --   --  342   NEUTROS ABS  --  16.08* 19.60*  --   --  18.52*   IMMATURE GRANS (ABS)  --  0.09* 1.58*  --   --   --    LYMPHS ABS  --  2.81 3.22*  --   --   --    MONOS ABS  --  1.66* 1.90*  --   --   --    EOS ABS  --  0.32 0.02  --   --  0.26   MCV 82.5 84.1 83.0  --   --  82.5   PROCALCITONIN  --   --   --   --   --  9.02*   LACTATE  --   --  1.8 2.3* 2.4* 3.3*   D DIMER QUANT  --   --   --   --   --  3.21*         Lab 12/06/22  0531 12/05/22  0505 12/04/22  1423 12/04/22  0307   SODIUM 136 136 134* 135*   POTASSIUM 3.9 4.1 3.3* 3.6   CHLORIDE 99 98 94* 92*   CO2 26.0 28.1 27.2 26.9   ANION GAP 11.0 9.9 12.8 16.1*   BUN 21* 22* 18 22*   CREATININE 0.97 1.00 1.19 1.76*   EGFR 92.8 89.4 72.6 45.4*   GLUCOSE 204* 211* 276* 387*   CALCIUM 9.2 9.1 8.8 9.7   MAGNESIUM 2.0 2.2 2.1 2.2   PHOSPHORUS  --  3.2  --  4.1         Lab 12/05/22  0505 12/04/22  0307   TOTAL PROTEIN 7.0 8.3   ALBUMIN 2.90* 4.20   GLOBULIN 4.1 4.1   ALT (SGPT) 20 27   AST (SGOT) 18 21   BILIRUBIN 0.4 0.8   ALK PHOS 107 130*   LIPASE  --  31         Lab 12/04/22  0307   PROBNP 284.9                 Brief Urine Lab Results  (Last result in the past 365 days)      Color   Clarity   Blood   Leuk Est   Nitrite   Protein   CREAT   Urine HCG        12/04/22 0334  Yellow   Clear   Trace   Negative   Negative   100 mg/dL (2+)               Microbiology Results (last 10 days)     Procedure Component Value - Date/Time    MRSA Screen, PCR (Inpatient) - Swab, Nares [[id number]]  (Normal) Collected: 12/04/22 1843    Lab Status: Final result Specimen: Swab from Nares Updated: 12/04/22 2158     MRSA PCR No MRSA Detected    Narrative:      The negative predictive value of this diagnostic test is high and should only be used to consider de-escalating anti-MRSA therapy. A positive result may indicate colonization with MRSA and must be correlated clinically.    Legionella Antigen, Urine - Urine, Urine, Clean Catch [[id number]]  (Normal) Collected: 12/04/22 0335    Lab Status: Final result Specimen: Urine, Clean Catch Updated: 12/04/22 1224     LEGIONELLA ANTIGEN, URINE Negative    S. Pneumo Ag Urine or CSF - Urine, Urine, Clean Catch [[id number]]  (Normal) Collected: 12/04/22 0335    Lab Status: Final result Specimen: Urine, Clean Catch Updated: 12/04/22 1225     Strep Pneumo Ag Negative    Influenza Antigen, Rapid - Swab, Nasopharynx [[id number]]  (Normal) Collected: 12/04/22 0314    Lab Status: Final result Specimen: Swab from Nasopharynx Updated: 12/04/22 0350     Influenza A Ag, EIA Negative     Influenza B Ag, EIA Negative    COVID-19,APTIMA PANTHER(PAUL),[facility], NP/OP SWAB IN UTM/VTM/SALINE TRANSPORT MEDIA,24 HR TAT - Swab, Nasal Cavity [[id number]]  (Normal) Collected: 12/04/22 0314    Lab Status: Final result Specimen: Swab from Nasal Cavity Updated: 12/04/22 1627     COVID19 Not Detected    Narrative:      Fact sheet for providers: https://www.fda.gov/media/456235/download     Fact sheet for patients: https://www.fda.gov/media/917899/download    Test performed by RT PCR.    Blood Culture - Blood, Arm, Right [[id number]]  (Normal) Collected: 12/04/22 0307    Lab Status: Preliminary result Specimen: Blood from Arm, Right Updated: 12/06/22 0330     Blood Culture No growth at 2 days     Blood Culture - Blood, Arm, Right [[id number]]  (Normal) Collected: 12/04/22 0307    Lab Status: Preliminary result Specimen: Blood from Arm, Right Updated: 12/06/22 0330     Blood Culture No growth at 2 days          CT Chest With Contrast Diagnostic    Result Date: 12/4/2022  Impression:  No pulmonary embolism.  Left upper lobe pneumonia is present.  There may be mild acute infiltrate(s) in the right upper lobe, as discussed.  Mild cardiomegaly is suggested.     Please note that portions of this note were completed with a voice recognition program.  ARAM MCCAULEY JR, MD       Electronically Signed and Approved By: ARAM MCCAULEY JR, MD on 12/04/2022 at 5:23              XR Chest 1 View    Result Date: 12/4/2022  Impression:  Left-sided pneumonia.     Please note that portions of this note were completed with a voice recognition program.  ARAM MCCAULEY JR, MD       Electronically Signed and Approved By: ARAM MCCAULEY JR, MD on 12/04/2022 at 3:51                 CT Chest With Contrast Diagnostic    Result Date: 12/4/2022  Narrative: PROCEDURE: CT CHEST W CONTRAST DIAGNOSTIC  COMPARISON: None.  INDICATIONS: CHEST PAIN & SHORTNESS OF BREATH FOR 1 WEEK.  TECHNIQUE: After obtaining the patient's consent, 821 CT/CTA images were obtained with non-ionic intravenous contrast material.   PROTOCOL:   Standard pulmonary arteriogram CTA imaging protocol performed.    RADIATION:   Total DLP: 464.5 mGy*cm.   Automated exposure control was utilized to minimize radiation dose. CONTRAST: 100 mL Isovue 370 I.V.  FINDINGS: No pulmonary embolism.  Dense pulmonary consolidation involves left upper lobe.  Left upper lobe pneumonia is suggested.  To a much lesser extent, a somewhat nodular ground-glass opacity involves the right upper lobe, measuring about 2.6 x 2.2 x 2.3 cm in AP (anteroposterior), transverse, and craniocaudal extent, respectively, as seen on image 51 of series 404, image 98 of series 402, and image 53 of series  403.  This finding is noncalcified and non-cavitating.  It may represent pneumonia.  Consider close interval clinical and imaging follow-up of the findings to ensure a benign progression to exclude an underlying malignant process.  Minimal, if any, pleural effusion is seen.  There may be a trace amount of pericardial effusion.  Mild cardiomegaly is possible.  There are small-to-moderate-sized mediastinal lymph nodes.  They may be reactive in nature.  Minimal residual thymic tissue is possible.  There is diffuse hepatic steatosis.  Hepatomegaly is possible.  The liver is partially imaged on the study.  No splenomegaly is suspected.  No acute findings are seen within the partially imaged upper abdomen.  There may be linear atelectasis and/or fibrosis in the left lower lobe.  To a lesser extent, similar findings are seen contralaterally.  Degenerative changes involve the imaged spine.  No acute fracture or aggressive osseous lesion is appreciated.  There may be minimal coronary artery calcifications.  The central tracheobronchial tree is well aerated without filling defect.  There may be mild peribronchial thickening, especially on the left.  No thoracic aortic aneurysm or dissection is appreciated.      Impression:  No pulmonary embolism.  Left upper lobe pneumonia is present.  There may be mild acute infiltrate(s) in the right upper lobe, as discussed.  Mild cardiomegaly is suggested.     Please note that portions of this note were completed with a voice recognition program.  ARAM MCCAULEY JR, MD       Electronically Signed and Approved By: ARAM MCCAULEY JR, MD on 12/04/2022 at 5:23              XR Chest 1 View    Result Date: 12/4/2022  Narrative: PROCEDURE: XR CHEST 1 VW  COMPARISON: None.  INDICATIONS: CHEST PAIN.  FINDINGS: A single AP upright portable chest radiograph is provided for review.  Atelectasis and/or infiltrate(s) is (are) seen in the left lung, especially in the left upper lobe and possibly the  left infrahilar region.  The findings suggest pneumonia.  Consider imaging follow-up of the findings to ensure a benign progression (and to exclude an underlying malignant process).  Probably no acute infiltrate is seen on the right.  There may be borderline cardiac enlargement.  No pneumothorax is seen.  External artifacts obscure detail.  Chronic calcified granulomatous disease may involve the chest.  The thoracic aorta is atherosclerotic.  There are degenerative changes of the bilateral shoulders.      Impression:  Left-sided pneumonia.     Please note that portions of this note were completed with a voice recognition program.  ARAM MCCAULEY JR, MD       Electronically Signed and Approved By: ARAM MCCAULEY JR, MD on 12/04/2022 at 3:51                          Labs Pending at Discharge:  Pending Labs     Order Current Status    Blood Culture - Blood, Arm, Right Preliminary result    Blood Culture - Blood, Arm, Right Preliminary result            Time spent on Discharge including face to face service:  34 minutes    Electronically signed by Mal Bennett MD, 12/06/22, 1:42 PM EST.

## 2022-12-06 NOTE — SIGNIFICANT NOTE
12/06/22 0919   Plan   Final Discharge Disposition Code 01 - home or self-care   Final Note WINSOME contacted [employer] and they state every cab company will be $2.50/mile. Pts medication will cost around $66. SW notified pt and pt states he will be able to put expenses on a credit card.

## 2022-12-06 NOTE — OUTREACH NOTE
Prep Survey    Flowsheet Row Responses   [facility] facility patient discharged from? [facility]   Is LACE score < 7 ? Yes   Emergency Room discharge w/ pulse ox? No   Eligibility Not Eligible   What are the reasons patient is not eligible? Other  [Low risk for readmission]   Does the patient have one of the following disease processes/diagnoses(primary or secondary)? Other   Prep survey completed? Yes          SASCHA DYE - Registered Nurse

## 2022-12-09 LAB
BACTERIA SPEC AEROBE CULT: NORMAL
BACTERIA SPEC AEROBE CULT: NORMAL

## 2022-12-11 ENCOUNTER — TELEPHONE (OUTPATIENT)
Dept: GASTROENTEROLOGY | Facility: CLINIC | Age: 52
End: 2022-12-11

## 2022-12-12 NOTE — TELEPHONE ENCOUNTER
EGD 12/5/2022: Small hiatal hernia, moderate Schatzki's ring with dilation performed, normal stomach and normal duodenum    Please call and check on patient, he should follow-up with GI if still having any dysphagia or GI symptoms, Discharge summary states patient from [place]

## 2022-12-21 NOTE — TELEPHONE ENCOUNTER
Pt called back, verbalized understanding of results, pt states not having any GI symptoms at this time but will follow up with GI if symptoms return

## (undated) DEVICE — DEV INFL CRE STERIFLATE 60CC DISP

## (undated) DEVICE — SOLIDIFIER LIQLOC PLS 1500CC BT

## (undated) DEVICE — Device: Brand: DEFENDO AIR/WATER/SUCTION AND BIOPSY VALVE

## (undated) DEVICE — SOL IRRG H2O PL/BG 1000ML STRL

## (undated) DEVICE — Device

## (undated) DEVICE — CONN JET HYDRA H20 AUXILIARY DISP

## (undated) DEVICE — ESOPHAGEAL BALLOON DILATATION CATHETER: Brand: CRE FIXED WIRE

## (undated) DEVICE — LINER SURG CANSTR SXN S/RIGD 1500CC

## (undated) DEVICE — BLCK/BITE BLOX WO/DENTL/RIM W/STRAP 54F